# Patient Record
Sex: FEMALE | Race: WHITE | NOT HISPANIC OR LATINO | Employment: OTHER | URBAN - METROPOLITAN AREA
[De-identification: names, ages, dates, MRNs, and addresses within clinical notes are randomized per-mention and may not be internally consistent; named-entity substitution may affect disease eponyms.]

---

## 2017-04-17 ENCOUNTER — RECORDS - HEALTHEAST (OUTPATIENT)
Dept: LAB | Facility: CLINIC | Age: 82
End: 2017-04-17

## 2017-04-17 LAB
CHOLEST SERPL-MCNC: 213 MG/DL
FASTING STATUS PATIENT QL REPORTED: YES
HDLC SERPL-MCNC: 69 MG/DL
LDLC SERPL CALC-MCNC: 130 MG/DL
TRIGL SERPL-MCNC: 70 MG/DL

## 2017-10-05 ENCOUNTER — RECORDS - HEALTHEAST (OUTPATIENT)
Dept: LAB | Facility: CLINIC | Age: 82
End: 2017-10-05

## 2017-10-05 LAB
CHOLEST SERPL-MCNC: 208 MG/DL
FASTING STATUS PATIENT QL REPORTED: YES
HDLC SERPL-MCNC: 66 MG/DL
LDLC SERPL CALC-MCNC: 127 MG/DL
TRIGL SERPL-MCNC: 76 MG/DL

## 2018-03-08 ENCOUNTER — RECORDS - HEALTHEAST (OUTPATIENT)
Dept: LAB | Facility: CLINIC | Age: 83
End: 2018-03-08

## 2018-03-08 LAB
ALBUMIN SERPL-MCNC: 4.1 G/DL (ref 3.5–5)
ALP SERPL-CCNC: 61 U/L (ref 45–120)
ALT SERPL W P-5'-P-CCNC: 22 U/L (ref 0–45)
ANION GAP SERPL CALCULATED.3IONS-SCNC: 11 MMOL/L (ref 5–18)
AST SERPL W P-5'-P-CCNC: 28 U/L (ref 0–40)
BASOPHILS # BLD AUTO: 0 THOU/UL (ref 0–0.2)
BASOPHILS NFR BLD AUTO: 1 % (ref 0–2)
BILIRUB SERPL-MCNC: 0.8 MG/DL (ref 0–1)
BUN SERPL-MCNC: 27 MG/DL (ref 8–28)
CALCIUM SERPL-MCNC: 9.8 MG/DL (ref 8.5–10.5)
CHLORIDE BLD-SCNC: 99 MMOL/L (ref 98–107)
CHOLEST SERPL-MCNC: 245 MG/DL
CO2 SERPL-SCNC: 25 MMOL/L (ref 22–31)
CREAT SERPL-MCNC: 0.83 MG/DL (ref 0.6–1.1)
EOSINOPHIL # BLD AUTO: 0.2 THOU/UL (ref 0–0.4)
EOSINOPHIL NFR BLD AUTO: 3 % (ref 0–6)
ERYTHROCYTE [DISTWIDTH] IN BLOOD BY AUTOMATED COUNT: 12.9 % (ref 11–14.5)
FASTING STATUS PATIENT QL REPORTED: YES
GFR SERPL CREATININE-BSD FRML MDRD: >60 ML/MIN/1.73M2
GLUCOSE BLD-MCNC: 98 MG/DL (ref 70–125)
HCT VFR BLD AUTO: 40.8 % (ref 35–47)
HDLC SERPL-MCNC: 70 MG/DL
HGB BLD-MCNC: 13.6 G/DL (ref 12–16)
LDLC SERPL CALC-MCNC: 158 MG/DL
LYMPHOCYTES # BLD AUTO: 2.2 THOU/UL (ref 0.8–4.4)
LYMPHOCYTES NFR BLD AUTO: 34 % (ref 20–40)
MCH RBC QN AUTO: 29.9 PG (ref 27–34)
MCHC RBC AUTO-ENTMCNC: 33.3 G/DL (ref 32–36)
MCV RBC AUTO: 90 FL (ref 80–100)
MONOCYTES # BLD AUTO: 0.6 THOU/UL (ref 0–0.9)
MONOCYTES NFR BLD AUTO: 10 % (ref 2–10)
NEUTROPHILS # BLD AUTO: 3.4 THOU/UL (ref 2–7.7)
NEUTROPHILS NFR BLD AUTO: 53 % (ref 50–70)
PLATELET # BLD AUTO: 174 THOU/UL (ref 140–440)
PMV BLD AUTO: 9.3 FL (ref 8.5–12.5)
POTASSIUM BLD-SCNC: 4.6 MMOL/L (ref 3.5–5)
PROT SERPL-MCNC: 6.9 G/DL (ref 6–8)
RBC # BLD AUTO: 4.55 MILL/UL (ref 3.8–5.4)
SODIUM SERPL-SCNC: 135 MMOL/L (ref 136–145)
TRIGL SERPL-MCNC: 87 MG/DL
TSH SERPL DL<=0.005 MIU/L-ACNC: 2.89 UIU/ML (ref 0.3–5)
WBC: 6.4 THOU/UL (ref 4–11)

## 2018-03-09 LAB — 25(OH)D3 SERPL-MCNC: 47.3 NG/ML (ref 30–80)

## 2018-04-27 ENCOUNTER — RECORDS - HEALTHEAST (OUTPATIENT)
Dept: LAB | Facility: CLINIC | Age: 83
End: 2018-04-27

## 2018-04-27 LAB
ALBUMIN SERPL-MCNC: 3.8 G/DL (ref 3.5–5)
ALP SERPL-CCNC: 72 U/L (ref 45–120)
ALT SERPL W P-5'-P-CCNC: 20 U/L (ref 0–45)
ANION GAP SERPL CALCULATED.3IONS-SCNC: 12 MMOL/L (ref 5–18)
AST SERPL W P-5'-P-CCNC: 24 U/L (ref 0–40)
BILIRUB SERPL-MCNC: 0.6 MG/DL (ref 0–1)
BUN SERPL-MCNC: 20 MG/DL (ref 8–28)
C REACTIVE PROTEIN LHE: 0.3 MG/DL (ref 0–0.8)
CALCIUM SERPL-MCNC: 9.6 MG/DL (ref 8.5–10.5)
CHLORIDE BLD-SCNC: 96 MMOL/L (ref 98–107)
CO2 SERPL-SCNC: 23 MMOL/L (ref 22–31)
CREAT SERPL-MCNC: 0.99 MG/DL (ref 0.6–1.1)
ERYTHROCYTE [DISTWIDTH] IN BLOOD BY AUTOMATED COUNT: 12.2 % (ref 11–14.5)
ERYTHROCYTE [SEDIMENTATION RATE] IN BLOOD BY WESTERGREN METHOD: 9 MM/HR (ref 0–20)
GFR SERPL CREATININE-BSD FRML MDRD: 53 ML/MIN/1.73M2
GLUCOSE BLD-MCNC: 135 MG/DL (ref 70–125)
HCT VFR BLD AUTO: 38.6 % (ref 35–47)
HGB BLD-MCNC: 13.3 G/DL (ref 12–16)
MCH RBC QN AUTO: 30.4 PG (ref 27–34)
MCHC RBC AUTO-ENTMCNC: 34.5 G/DL (ref 32–36)
MCV RBC AUTO: 88 FL (ref 80–100)
PLATELET # BLD AUTO: 149 THOU/UL (ref 140–440)
PMV BLD AUTO: 9.3 FL (ref 8.5–12.5)
POTASSIUM BLD-SCNC: 3.9 MMOL/L (ref 3.5–5)
PROT SERPL-MCNC: 6.6 G/DL (ref 6–8)
RBC # BLD AUTO: 4.37 MILL/UL (ref 3.8–5.4)
SODIUM SERPL-SCNC: 131 MMOL/L (ref 136–145)
WBC: 5.4 THOU/UL (ref 4–11)

## 2018-09-24 ENCOUNTER — RECORDS - HEALTHEAST (OUTPATIENT)
Dept: LAB | Facility: CLINIC | Age: 83
End: 2018-09-24

## 2018-09-24 LAB
ALBUMIN SERPL-MCNC: 3.9 G/DL (ref 3.5–5)
ALP SERPL-CCNC: 58 U/L (ref 45–120)
ALT SERPL W P-5'-P-CCNC: 19 U/L (ref 0–45)
ANION GAP SERPL CALCULATED.3IONS-SCNC: 8 MMOL/L (ref 5–18)
AST SERPL W P-5'-P-CCNC: 26 U/L (ref 0–40)
BASOPHILS # BLD AUTO: 0 THOU/UL (ref 0–0.2)
BASOPHILS NFR BLD AUTO: 1 % (ref 0–2)
BILIRUB SERPL-MCNC: 0.6 MG/DL (ref 0–1)
BUN SERPL-MCNC: 15 MG/DL (ref 8–28)
CALCIUM SERPL-MCNC: 9.5 MG/DL (ref 8.5–10.5)
CHLORIDE BLD-SCNC: 100 MMOL/L (ref 98–107)
CHOLEST SERPL-MCNC: 210 MG/DL
CO2 SERPL-SCNC: 26 MMOL/L (ref 22–31)
CREAT SERPL-MCNC: 0.72 MG/DL (ref 0.6–1.1)
EOSINOPHIL # BLD AUTO: 0.1 THOU/UL (ref 0–0.4)
EOSINOPHIL NFR BLD AUTO: 2 % (ref 0–6)
ERYTHROCYTE [DISTWIDTH] IN BLOOD BY AUTOMATED COUNT: 13 % (ref 11–14.5)
FASTING STATUS PATIENT QL REPORTED: YES
GFR SERPL CREATININE-BSD FRML MDRD: >60 ML/MIN/1.73M2
GLUCOSE BLD-MCNC: 92 MG/DL (ref 70–125)
HCT VFR BLD AUTO: 36.9 % (ref 35–47)
HDLC SERPL-MCNC: 71 MG/DL
HGB BLD-MCNC: 13 G/DL (ref 12–16)
LDLC SERPL CALC-MCNC: 127 MG/DL
LYMPHOCYTES # BLD AUTO: 1.6 THOU/UL (ref 0.8–4.4)
LYMPHOCYTES NFR BLD AUTO: 31 % (ref 20–40)
MCH RBC QN AUTO: 31 PG (ref 27–34)
MCHC RBC AUTO-ENTMCNC: 35.2 G/DL (ref 32–36)
MCV RBC AUTO: 88 FL (ref 80–100)
MONOCYTES # BLD AUTO: 0.5 THOU/UL (ref 0–0.9)
MONOCYTES NFR BLD AUTO: 11 % (ref 2–10)
NEUTROPHILS # BLD AUTO: 2.8 THOU/UL (ref 2–7.7)
NEUTROPHILS NFR BLD AUTO: 56 % (ref 50–70)
PLATELET # BLD AUTO: 175 THOU/UL (ref 140–440)
PMV BLD AUTO: 9.3 FL (ref 8.5–12.5)
POTASSIUM BLD-SCNC: 4.5 MMOL/L (ref 3.5–5)
PROT SERPL-MCNC: 6.4 G/DL (ref 6–8)
RBC # BLD AUTO: 4.2 MILL/UL (ref 3.8–5.4)
SODIUM SERPL-SCNC: 134 MMOL/L (ref 136–145)
T4 FREE SERPL-MCNC: 1.4 NG/DL (ref 0.7–1.8)
TRIGL SERPL-MCNC: 61 MG/DL
TSH SERPL DL<=0.005 MIU/L-ACNC: 1.62 UIU/ML (ref 0.3–5)
WBC: 5 THOU/UL (ref 4–11)

## 2018-09-25 LAB — 25(OH)D3 SERPL-MCNC: 28.2 NG/ML (ref 30–80)

## 2019-03-18 ENCOUNTER — RECORDS - HEALTHEAST (OUTPATIENT)
Dept: LAB | Facility: CLINIC | Age: 84
End: 2019-03-18

## 2019-03-18 LAB
ALBUMIN SERPL-MCNC: 4.1 G/DL (ref 3.5–5)
ALP SERPL-CCNC: 60 U/L (ref 45–120)
ALT SERPL W P-5'-P-CCNC: 17 U/L (ref 0–45)
ANION GAP SERPL CALCULATED.3IONS-SCNC: 11 MMOL/L (ref 5–18)
AST SERPL W P-5'-P-CCNC: 24 U/L (ref 0–40)
BASOPHILS # BLD AUTO: 0 THOU/UL (ref 0–0.2)
BASOPHILS NFR BLD AUTO: 1 % (ref 0–2)
BILIRUB SERPL-MCNC: 0.6 MG/DL (ref 0–1)
BUN SERPL-MCNC: 16 MG/DL (ref 8–28)
CALCIUM SERPL-MCNC: 9.6 MG/DL (ref 8.5–10.5)
CHLORIDE BLD-SCNC: 97 MMOL/L (ref 98–107)
CHOLEST SERPL-MCNC: 221 MG/DL
CO2 SERPL-SCNC: 24 MMOL/L (ref 22–31)
CREAT SERPL-MCNC: 0.85 MG/DL (ref 0.6–1.1)
EOSINOPHIL # BLD AUTO: 0.2 THOU/UL (ref 0–0.4)
EOSINOPHIL NFR BLD AUTO: 2 % (ref 0–6)
ERYTHROCYTE [DISTWIDTH] IN BLOOD BY AUTOMATED COUNT: 12.6 % (ref 11–14.5)
FASTING STATUS PATIENT QL REPORTED: YES
GFR SERPL CREATININE-BSD FRML MDRD: >60 ML/MIN/1.73M2
GLUCOSE BLD-MCNC: 86 MG/DL (ref 70–125)
HCT VFR BLD AUTO: 39.9 % (ref 35–47)
HDLC SERPL-MCNC: 81 MG/DL
HGB BLD-MCNC: 13.3 G/DL (ref 12–16)
LDLC SERPL CALC-MCNC: 125 MG/DL
LYMPHOCYTES # BLD AUTO: 2.3 THOU/UL (ref 0.8–4.4)
LYMPHOCYTES NFR BLD AUTO: 34 % (ref 20–40)
MCH RBC QN AUTO: 30.2 PG (ref 27–34)
MCHC RBC AUTO-ENTMCNC: 33.3 G/DL (ref 32–36)
MCV RBC AUTO: 91 FL (ref 80–100)
MONOCYTES # BLD AUTO: 0.8 THOU/UL (ref 0–0.9)
MONOCYTES NFR BLD AUTO: 12 % (ref 2–10)
NEUTROPHILS # BLD AUTO: 3.5 THOU/UL (ref 2–7.7)
NEUTROPHILS NFR BLD AUTO: 52 % (ref 50–70)
PLATELET # BLD AUTO: 179 THOU/UL (ref 140–440)
PMV BLD AUTO: 9.2 FL (ref 8.5–12.5)
POTASSIUM BLD-SCNC: 4.2 MMOL/L (ref 3.5–5)
PROT SERPL-MCNC: 6.6 G/DL (ref 6–8)
RBC # BLD AUTO: 4.41 MILL/UL (ref 3.8–5.4)
SODIUM SERPL-SCNC: 132 MMOL/L (ref 136–145)
TRIGL SERPL-MCNC: 75 MG/DL
TSH SERPL DL<=0.005 MIU/L-ACNC: 3.22 UIU/ML (ref 0.3–5)
WBC: 6.7 THOU/UL (ref 4–11)

## 2019-03-19 LAB — 25(OH)D3 SERPL-MCNC: 40 NG/ML (ref 30–80)

## 2019-09-19 ENCOUNTER — RECORDS - HEALTHEAST (OUTPATIENT)
Dept: LAB | Facility: CLINIC | Age: 84
End: 2019-09-19

## 2019-09-19 LAB
ALBUMIN SERPL-MCNC: 4.1 G/DL (ref 3.5–5)
ALP SERPL-CCNC: 59 U/L (ref 45–120)
ALT SERPL W P-5'-P-CCNC: 18 U/L (ref 0–45)
ANION GAP SERPL CALCULATED.3IONS-SCNC: 8 MMOL/L (ref 5–18)
AST SERPL W P-5'-P-CCNC: 26 U/L (ref 0–40)
BILIRUB SERPL-MCNC: 0.8 MG/DL (ref 0–1)
BUN SERPL-MCNC: 16 MG/DL (ref 8–28)
CALCIUM SERPL-MCNC: 9.5 MG/DL (ref 8.5–10.5)
CHLORIDE BLD-SCNC: 97 MMOL/L (ref 98–107)
CHOLEST SERPL-MCNC: 219 MG/DL
CO2 SERPL-SCNC: 26 MMOL/L (ref 22–31)
CREAT SERPL-MCNC: 0.81 MG/DL (ref 0.6–1.1)
FASTING STATUS PATIENT QL REPORTED: ABNORMAL
GFR SERPL CREATININE-BSD FRML MDRD: >60 ML/MIN/1.73M2
GLUCOSE BLD-MCNC: 87 MG/DL (ref 70–125)
HDLC SERPL-MCNC: 79 MG/DL
LDLC SERPL CALC-MCNC: 123 MG/DL
POTASSIUM BLD-SCNC: 4.3 MMOL/L (ref 3.5–5)
PROT SERPL-MCNC: 6.5 G/DL (ref 6–8)
SODIUM SERPL-SCNC: 131 MMOL/L (ref 136–145)
TRIGL SERPL-MCNC: 84 MG/DL

## 2019-09-20 LAB — 25(OH)D3 SERPL-MCNC: 37.8 NG/ML (ref 30–80)

## 2019-11-01 ENCOUNTER — RECORDS - HEALTHEAST (OUTPATIENT)
Dept: LAB | Facility: CLINIC | Age: 84
End: 2019-11-01

## 2019-11-01 LAB
O+P STL MICRO: NORMAL
SHIGA TOXIN 1: NEGATIVE
SHIGA TOXIN 2: NEGATIVE

## 2019-11-03 LAB — BACTERIA SPEC CULT: NORMAL

## 2020-05-12 ENCOUNTER — RECORDS - HEALTHEAST (OUTPATIENT)
Dept: LAB | Facility: CLINIC | Age: 85
End: 2020-05-12

## 2020-05-12 LAB
ALBUMIN SERPL-MCNC: 4.1 G/DL (ref 3.5–5)
ALP SERPL-CCNC: 54 U/L (ref 45–120)
ALT SERPL W P-5'-P-CCNC: 14 U/L (ref 0–45)
ANION GAP SERPL CALCULATED.3IONS-SCNC: 8 MMOL/L (ref 5–18)
AST SERPL W P-5'-P-CCNC: 21 U/L (ref 0–40)
BILIRUB SERPL-MCNC: 0.6 MG/DL (ref 0–1)
BUN SERPL-MCNC: 18 MG/DL (ref 8–28)
CALCIUM SERPL-MCNC: 9.5 MG/DL (ref 8.5–10.5)
CHLORIDE BLD-SCNC: 95 MMOL/L (ref 98–107)
CHOLEST SERPL-MCNC: 206 MG/DL
CO2 SERPL-SCNC: 27 MMOL/L (ref 22–31)
CREAT SERPL-MCNC: 0.8 MG/DL (ref 0.6–1.1)
FASTING STATUS PATIENT QL REPORTED: YES
GFR SERPL CREATININE-BSD FRML MDRD: >60 ML/MIN/1.73M2
GLUCOSE BLD-MCNC: 91 MG/DL (ref 70–125)
HDLC SERPL-MCNC: 73 MG/DL
LDLC SERPL CALC-MCNC: 117 MG/DL
POTASSIUM BLD-SCNC: 4.8 MMOL/L (ref 3.5–5)
PROT SERPL-MCNC: 6.5 G/DL (ref 6–8)
SODIUM SERPL-SCNC: 130 MMOL/L (ref 136–145)
T4 FREE SERPL-MCNC: 1.3 NG/DL (ref 0.7–1.8)
TRIGL SERPL-MCNC: 79 MG/DL
TSH SERPL DL<=0.005 MIU/L-ACNC: 1.91 UIU/ML (ref 0.3–5)

## 2020-05-15 LAB — 25(OH)D3 SERPL-MCNC: 34.3 NG/ML (ref 30–80)

## 2020-10-05 ENCOUNTER — RECORDS - HEALTHEAST (OUTPATIENT)
Dept: LAB | Facility: CLINIC | Age: 85
End: 2020-10-05

## 2020-10-05 LAB
ALBUMIN SERPL-MCNC: 4.4 G/DL (ref 3.5–5)
ALP SERPL-CCNC: 58 U/L (ref 45–120)
ALT SERPL W P-5'-P-CCNC: 16 U/L (ref 0–45)
ANION GAP SERPL CALCULATED.3IONS-SCNC: 11 MMOL/L (ref 5–18)
AST SERPL W P-5'-P-CCNC: 25 U/L (ref 0–40)
BASOPHILS # BLD AUTO: 0 THOU/UL (ref 0–0.2)
BASOPHILS NFR BLD AUTO: 1 % (ref 0–2)
BILIRUB SERPL-MCNC: 0.9 MG/DL (ref 0–1)
BUN SERPL-MCNC: 14 MG/DL (ref 8–28)
CALCIUM SERPL-MCNC: 9.6 MG/DL (ref 8.5–10.5)
CHLORIDE BLD-SCNC: 95 MMOL/L (ref 98–107)
CHOLEST SERPL-MCNC: 214 MG/DL
CO2 SERPL-SCNC: 24 MMOL/L (ref 22–31)
CREAT SERPL-MCNC: 0.77 MG/DL (ref 0.6–1.1)
EOSINOPHIL # BLD AUTO: 0.1 THOU/UL (ref 0–0.4)
EOSINOPHIL NFR BLD AUTO: 2 % (ref 0–6)
ERYTHROCYTE [DISTWIDTH] IN BLOOD BY AUTOMATED COUNT: 12.8 % (ref 11–14.5)
FASTING STATUS PATIENT QL REPORTED: ABNORMAL
GFR SERPL CREATININE-BSD FRML MDRD: >60 ML/MIN/1.73M2
GLUCOSE BLD-MCNC: 88 MG/DL (ref 70–125)
HCT VFR BLD AUTO: 39.8 % (ref 35–47)
HDLC SERPL-MCNC: 77 MG/DL
HGB BLD-MCNC: 13.5 G/DL (ref 12–16)
IMM GRANULOCYTES # BLD: 0 THOU/UL
IMM GRANULOCYTES NFR BLD: 0 %
LDLC SERPL CALC-MCNC: 121 MG/DL
LYMPHOCYTES # BLD AUTO: 2.3 THOU/UL (ref 0.8–4.4)
LYMPHOCYTES NFR BLD AUTO: 36 % (ref 20–40)
MCH RBC QN AUTO: 30.7 PG (ref 27–34)
MCHC RBC AUTO-ENTMCNC: 33.9 G/DL (ref 32–36)
MCV RBC AUTO: 91 FL (ref 80–100)
MONOCYTES # BLD AUTO: 0.7 THOU/UL (ref 0–0.9)
MONOCYTES NFR BLD AUTO: 11 % (ref 2–10)
NEUTROPHILS # BLD AUTO: 3.1 THOU/UL (ref 2–7.7)
NEUTROPHILS NFR BLD AUTO: 50 % (ref 50–70)
PLATELET # BLD AUTO: 177 THOU/UL (ref 140–440)
PMV BLD AUTO: 9.3 FL (ref 8.5–12.5)
POTASSIUM BLD-SCNC: 4.5 MMOL/L (ref 3.5–5)
PROT SERPL-MCNC: 6.7 G/DL (ref 6–8)
RBC # BLD AUTO: 4.4 MILL/UL (ref 3.8–5.4)
SODIUM SERPL-SCNC: 130 MMOL/L (ref 136–145)
T4 FREE SERPL-MCNC: 1.3 NG/DL (ref 0.7–1.8)
TRIGL SERPL-MCNC: 81 MG/DL
TSH SERPL DL<=0.005 MIU/L-ACNC: 2.54 UIU/ML (ref 0.3–5)
WBC: 6.2 THOU/UL (ref 4–11)

## 2020-10-06 LAB — 25(OH)D3 SERPL-MCNC: 44.6 NG/ML (ref 30–80)

## 2021-02-26 ENCOUNTER — AMBULATORY - HEALTHEAST (OUTPATIENT)
Dept: NURSING | Facility: CLINIC | Age: 86
End: 2021-02-26

## 2021-03-22 ENCOUNTER — AMBULATORY - HEALTHEAST (OUTPATIENT)
Dept: NURSING | Facility: CLINIC | Age: 86
End: 2021-03-22

## 2021-04-13 ENCOUNTER — RECORDS - HEALTHEAST (OUTPATIENT)
Dept: LAB | Facility: CLINIC | Age: 86
End: 2021-04-13

## 2021-04-13 LAB
ALBUMIN SERPL-MCNC: 4.2 G/DL (ref 3.5–5)
ALP SERPL-CCNC: 63 U/L (ref 45–120)
ALT SERPL W P-5'-P-CCNC: 13 U/L (ref 0–45)
ANION GAP SERPL CALCULATED.3IONS-SCNC: 12 MMOL/L (ref 5–18)
AST SERPL W P-5'-P-CCNC: 22 U/L (ref 0–40)
BASOPHILS # BLD AUTO: 0.1 THOU/UL (ref 0–0.2)
BASOPHILS NFR BLD AUTO: 1 % (ref 0–2)
BILIRUB SERPL-MCNC: 0.9 MG/DL (ref 0–1)
BUN SERPL-MCNC: 17 MG/DL (ref 8–28)
CALCIUM SERPL-MCNC: 9.5 MG/DL (ref 8.5–10.5)
CHLORIDE BLD-SCNC: 94 MMOL/L (ref 98–107)
CHOLEST SERPL-MCNC: 227 MG/DL
CO2 SERPL-SCNC: 24 MMOL/L (ref 22–31)
CREAT SERPL-MCNC: 0.75 MG/DL (ref 0.6–1.1)
EOSINOPHIL # BLD AUTO: 0.1 THOU/UL (ref 0–0.4)
EOSINOPHIL NFR BLD AUTO: 2 % (ref 0–6)
ERYTHROCYTE [DISTWIDTH] IN BLOOD BY AUTOMATED COUNT: 12.8 % (ref 11–14.5)
FASTING STATUS PATIENT QL REPORTED: YES
GFR SERPL CREATININE-BSD FRML MDRD: >60 ML/MIN/1.73M2
GLUCOSE BLD-MCNC: 95 MG/DL (ref 70–125)
HCT VFR BLD AUTO: 40.6 % (ref 35–47)
HDLC SERPL-MCNC: 77 MG/DL
HGB BLD-MCNC: 13.6 G/DL (ref 12–16)
IMM GRANULOCYTES # BLD: 0 THOU/UL
IMM GRANULOCYTES NFR BLD: 0 %
LDLC SERPL CALC-MCNC: 132 MG/DL
LYMPHOCYTES # BLD AUTO: 2.6 THOU/UL (ref 0.8–4.4)
LYMPHOCYTES NFR BLD AUTO: 34 % (ref 20–40)
MCH RBC QN AUTO: 30.2 PG (ref 27–34)
MCHC RBC AUTO-ENTMCNC: 33.5 G/DL (ref 32–36)
MCV RBC AUTO: 90 FL (ref 80–100)
MONOCYTES # BLD AUTO: 0.8 THOU/UL (ref 0–0.9)
MONOCYTES NFR BLD AUTO: 10 % (ref 2–10)
NEUTROPHILS # BLD AUTO: 4 THOU/UL (ref 2–7.7)
NEUTROPHILS NFR BLD AUTO: 53 % (ref 50–70)
PLATELET # BLD AUTO: 199 THOU/UL (ref 140–440)
PMV BLD AUTO: 9.2 FL (ref 8.5–12.5)
POTASSIUM BLD-SCNC: 4.5 MMOL/L (ref 3.5–5)
PROT SERPL-MCNC: 6.6 G/DL (ref 6–8)
RBC # BLD AUTO: 4.5 MILL/UL (ref 3.8–5.4)
SODIUM SERPL-SCNC: 130 MMOL/L (ref 136–145)
T4 FREE SERPL-MCNC: 1.4 NG/DL (ref 0.7–1.8)
TRIGL SERPL-MCNC: 91 MG/DL
TSH SERPL DL<=0.005 MIU/L-ACNC: 1.32 UIU/ML (ref 0.3–5)
WBC: 7.6 THOU/UL (ref 4–11)

## 2021-04-14 LAB — 25(OH)D3 SERPL-MCNC: 42.9 NG/ML (ref 30–80)

## 2021-05-25 ENCOUNTER — RECORDS - HEALTHEAST (OUTPATIENT)
Dept: ADMINISTRATIVE | Facility: CLINIC | Age: 86
End: 2021-05-25

## 2021-05-26 ENCOUNTER — RECORDS - HEALTHEAST (OUTPATIENT)
Dept: ADMINISTRATIVE | Facility: CLINIC | Age: 86
End: 2021-05-26

## 2021-05-27 ENCOUNTER — RECORDS - HEALTHEAST (OUTPATIENT)
Dept: ADMINISTRATIVE | Facility: CLINIC | Age: 86
End: 2021-05-27

## 2021-05-28 ENCOUNTER — RECORDS - HEALTHEAST (OUTPATIENT)
Dept: ADMINISTRATIVE | Facility: CLINIC | Age: 86
End: 2021-05-28

## 2021-06-16 PROBLEM — F41.9 ANXIETY: Status: ACTIVE | Noted: 2021-04-26

## 2021-06-16 PROBLEM — I10 HYPERTENSION: Status: ACTIVE | Noted: 2021-04-26

## 2021-06-16 PROBLEM — K14.6 GLOSSODYNIA: Status: ACTIVE | Noted: 2021-04-26

## 2021-06-16 PROBLEM — E03.9 HYPOTHYROIDISM: Status: ACTIVE | Noted: 2021-04-26

## 2021-06-16 PROBLEM — M25.562 PAIN IN LEFT KNEE: Status: ACTIVE | Noted: 2021-04-26

## 2021-06-16 PROBLEM — E78.5 HYPERLIPIDEMIA: Status: ACTIVE | Noted: 2021-04-26

## 2021-06-16 PROBLEM — R26.81 UNSTEADY GAIT: Status: ACTIVE | Noted: 2021-04-26

## 2021-06-16 PROBLEM — J30.2 SEASONAL ALLERGIC RHINITIS: Status: ACTIVE | Noted: 2021-04-26

## 2021-06-16 PROBLEM — E55.9 VITAMIN D DEFICIENCY: Status: ACTIVE | Noted: 2021-04-26

## 2021-07-13 ENCOUNTER — RECORDS - HEALTHEAST (OUTPATIENT)
Dept: ADMINISTRATIVE | Facility: CLINIC | Age: 86
End: 2021-07-13

## 2021-07-21 ENCOUNTER — RECORDS - HEALTHEAST (OUTPATIENT)
Dept: ADMINISTRATIVE | Facility: CLINIC | Age: 86
End: 2021-07-21

## 2021-10-07 ENCOUNTER — LAB REQUISITION (OUTPATIENT)
Dept: LAB | Facility: CLINIC | Age: 86
End: 2021-10-07

## 2021-10-07 DIAGNOSIS — I10 ESSENTIAL (PRIMARY) HYPERTENSION: ICD-10-CM

## 2021-10-07 DIAGNOSIS — E03.9 HYPOTHYROIDISM, UNSPECIFIED: ICD-10-CM

## 2021-10-07 DIAGNOSIS — E78.5 HYPERLIPIDEMIA, UNSPECIFIED: ICD-10-CM

## 2021-10-07 LAB
ALBUMIN SERPL-MCNC: 4.1 G/DL (ref 3.5–5)
ALP SERPL-CCNC: 57 U/L (ref 45–120)
ALT SERPL W P-5'-P-CCNC: 13 U/L (ref 0–45)
ANION GAP SERPL CALCULATED.3IONS-SCNC: 11 MMOL/L (ref 5–18)
AST SERPL W P-5'-P-CCNC: 24 U/L (ref 0–40)
BILIRUB SERPL-MCNC: 0.8 MG/DL (ref 0–1)
BUN SERPL-MCNC: 15 MG/DL (ref 8–28)
CALCIUM SERPL-MCNC: 9.8 MG/DL (ref 8.5–10.5)
CHLORIDE BLD-SCNC: 95 MMOL/L (ref 98–107)
CHOLEST SERPL-MCNC: 220 MG/DL
CO2 SERPL-SCNC: 24 MMOL/L (ref 22–31)
CREAT SERPL-MCNC: 0.8 MG/DL (ref 0.6–1.1)
ERYTHROCYTE [DISTWIDTH] IN BLOOD BY AUTOMATED COUNT: 12.9 % (ref 10–15)
GFR SERPL CREATININE-BSD FRML MDRD: 65 ML/MIN/1.73M2
GLUCOSE BLD-MCNC: 84 MG/DL (ref 70–125)
HCT VFR BLD AUTO: 37.5 % (ref 35–47)
HDLC SERPL-MCNC: 82 MG/DL
HGB BLD-MCNC: 12.9 G/DL (ref 11.7–15.7)
LDLC SERPL CALC-MCNC: 127 MG/DL
MCH RBC QN AUTO: 30.7 PG (ref 26.5–33)
MCHC RBC AUTO-ENTMCNC: 34.4 G/DL (ref 31.5–36.5)
MCV RBC AUTO: 89 FL (ref 78–100)
PLATELET # BLD AUTO: 169 10E3/UL (ref 150–450)
POTASSIUM BLD-SCNC: 4.6 MMOL/L (ref 3.5–5)
PROT SERPL-MCNC: 6.6 G/DL (ref 6–8)
RBC # BLD AUTO: 4.2 10E6/UL (ref 3.8–5.2)
SODIUM SERPL-SCNC: 130 MMOL/L (ref 136–145)
TRIGL SERPL-MCNC: 56 MG/DL
TSH SERPL DL<=0.005 MIU/L-ACNC: 1.37 UIU/ML (ref 0.3–5)
WBC # BLD AUTO: 5.9 10E3/UL (ref 4–11)

## 2021-10-07 PROCEDURE — 82040 ASSAY OF SERUM ALBUMIN: CPT | Performed by: FAMILY MEDICINE

## 2021-10-07 PROCEDURE — 80061 LIPID PANEL: CPT | Performed by: FAMILY MEDICINE

## 2021-10-07 PROCEDURE — 84439 ASSAY OF FREE THYROXINE: CPT | Performed by: FAMILY MEDICINE

## 2021-10-07 PROCEDURE — 84443 ASSAY THYROID STIM HORMONE: CPT | Performed by: FAMILY MEDICINE

## 2021-10-07 PROCEDURE — 82247 BILIRUBIN TOTAL: CPT | Performed by: FAMILY MEDICINE

## 2021-10-07 PROCEDURE — 85027 COMPLETE CBC AUTOMATED: CPT | Performed by: FAMILY MEDICINE

## 2021-10-08 LAB — T4 FREE SERPL-MCNC: 1.43 NG/DL (ref 0.7–1.8)

## 2022-08-19 ENCOUNTER — LAB REQUISITION (OUTPATIENT)
Dept: LAB | Facility: CLINIC | Age: 87
End: 2022-08-19

## 2022-08-19 DIAGNOSIS — E03.9 HYPOTHYROIDISM, UNSPECIFIED: ICD-10-CM

## 2022-08-19 DIAGNOSIS — I10 ESSENTIAL (PRIMARY) HYPERTENSION: ICD-10-CM

## 2022-08-19 LAB
ANION GAP SERPL CALCULATED.3IONS-SCNC: 11 MMOL/L (ref 7–15)
BUN SERPL-MCNC: 18 MG/DL (ref 8–23)
CALCIUM SERPL-MCNC: 9.5 MG/DL (ref 8.2–9.6)
CHLORIDE SERPL-SCNC: 94 MMOL/L (ref 98–107)
CREAT SERPL-MCNC: 0.75 MG/DL (ref 0.51–0.95)
DEPRECATED HCO3 PLAS-SCNC: 25 MMOL/L (ref 22–29)
GFR SERPL CREATININE-BSD FRML MDRD: 75 ML/MIN/1.73M2
GLUCOSE SERPL-MCNC: 86 MG/DL (ref 70–99)
POTASSIUM SERPL-SCNC: 4.9 MMOL/L (ref 3.4–5.3)
SODIUM SERPL-SCNC: 130 MMOL/L (ref 136–145)
TSH SERPL DL<=0.005 MIU/L-ACNC: 1.69 UIU/ML (ref 0.3–4.2)

## 2022-08-19 PROCEDURE — 80048 BASIC METABOLIC PNL TOTAL CA: CPT | Performed by: STUDENT IN AN ORGANIZED HEALTH CARE EDUCATION/TRAINING PROGRAM

## 2022-08-19 PROCEDURE — 84443 ASSAY THYROID STIM HORMONE: CPT | Performed by: STUDENT IN AN ORGANIZED HEALTH CARE EDUCATION/TRAINING PROGRAM

## 2023-02-06 ENCOUNTER — LAB REQUISITION (OUTPATIENT)
Dept: LAB | Facility: CLINIC | Age: 88
End: 2023-02-06

## 2023-02-06 DIAGNOSIS — E03.9 HYPOTHYROIDISM, UNSPECIFIED: ICD-10-CM

## 2023-02-06 DIAGNOSIS — E87.1 HYPO-OSMOLALITY AND HYPONATREMIA: ICD-10-CM

## 2023-02-06 DIAGNOSIS — I10 ESSENTIAL (PRIMARY) HYPERTENSION: ICD-10-CM

## 2023-02-06 LAB
ANION GAP SERPL CALCULATED.3IONS-SCNC: 14 MMOL/L (ref 7–15)
BUN SERPL-MCNC: 18.4 MG/DL (ref 8–23)
CALCIUM SERPL-MCNC: 9.6 MG/DL (ref 8.2–9.6)
CHLORIDE SERPL-SCNC: 93 MMOL/L (ref 98–107)
CREAT SERPL-MCNC: 0.79 MG/DL (ref 0.51–0.95)
DEPRECATED HCO3 PLAS-SCNC: 22 MMOL/L (ref 22–29)
GFR SERPL CREATININE-BSD FRML MDRD: 70 ML/MIN/1.73M2
GLUCOSE SERPL-MCNC: 90 MG/DL (ref 70–99)
OSMOLALITY UR: 423 MMOL/KG (ref 100–1200)
POTASSIUM SERPL-SCNC: 4.5 MMOL/L (ref 3.4–5.3)
SODIUM SERPL-SCNC: 129 MMOL/L (ref 136–145)
SODIUM UR-SCNC: 69 MMOL/L
TSH SERPL DL<=0.005 MIU/L-ACNC: 1.28 UIU/ML (ref 0.3–4.2)

## 2023-02-06 PROCEDURE — 83935 ASSAY OF URINE OSMOLALITY: CPT | Performed by: STUDENT IN AN ORGANIZED HEALTH CARE EDUCATION/TRAINING PROGRAM

## 2023-02-06 PROCEDURE — 80048 BASIC METABOLIC PNL TOTAL CA: CPT | Performed by: STUDENT IN AN ORGANIZED HEALTH CARE EDUCATION/TRAINING PROGRAM

## 2023-02-06 PROCEDURE — 84443 ASSAY THYROID STIM HORMONE: CPT | Performed by: STUDENT IN AN ORGANIZED HEALTH CARE EDUCATION/TRAINING PROGRAM

## 2023-02-06 PROCEDURE — 84300 ASSAY OF URINE SODIUM: CPT | Performed by: STUDENT IN AN ORGANIZED HEALTH CARE EDUCATION/TRAINING PROGRAM

## 2023-03-31 ENCOUNTER — APPOINTMENT (OUTPATIENT)
Dept: CT IMAGING | Facility: HOSPITAL | Age: 88
End: 2023-03-31
Attending: EMERGENCY MEDICINE
Payer: COMMERCIAL

## 2023-03-31 ENCOUNTER — HOSPITAL ENCOUNTER (EMERGENCY)
Facility: HOSPITAL | Age: 88
Discharge: HOME | End: 2023-03-31
Attending: EMERGENCY MEDICINE | Admitting: EMERGENCY MEDICINE
Payer: COMMERCIAL

## 2023-03-31 VITALS
DIASTOLIC BLOOD PRESSURE: 79 MMHG | HEART RATE: 82 BPM | SYSTOLIC BLOOD PRESSURE: 158 MMHG | TEMPERATURE: 98.2 F | OXYGEN SATURATION: 99 % | RESPIRATION RATE: 24 BRPM

## 2023-03-31 DIAGNOSIS — G93.89 BRAIN MASS: ICD-10-CM

## 2023-03-31 DIAGNOSIS — I10 ACCELERATED HYPERTENSION: ICD-10-CM

## 2023-03-31 LAB
ANION GAP SERPL CALCULATED.3IONS-SCNC: 8 MMOL/L (ref 7–15)
BUN SERPL-MCNC: 12.3 MG/DL (ref 8–23)
CALCIUM SERPL-MCNC: 9.3 MG/DL (ref 8.2–9.6)
CHLORIDE SERPL-SCNC: 91 MMOL/L (ref 98–107)
CREAT SERPL-MCNC: 0.7 MG/DL (ref 0.51–0.95)
DEPRECATED HCO3 PLAS-SCNC: 27 MMOL/L (ref 22–29)
ERYTHROCYTE [DISTWIDTH] IN BLOOD BY AUTOMATED COUNT: 12.8 % (ref 10–15)
GFR SERPL CREATININE-BSD FRML MDRD: 81 ML/MIN/1.73M2
GLUCOSE SERPL-MCNC: 99 MG/DL (ref 70–99)
HCT VFR BLD AUTO: 40 % (ref 35–47)
HGB BLD-MCNC: 14.1 G/DL (ref 11.7–15.7)
MAGNESIUM SERPL-MCNC: 1.8 MG/DL (ref 1.7–2.3)
MCH RBC QN AUTO: 30.7 PG (ref 26.5–33)
MCHC RBC AUTO-ENTMCNC: 35.3 G/DL (ref 31.5–36.5)
MCV RBC AUTO: 87 FL (ref 78–100)
PLATELET # BLD AUTO: 161 10E3/UL (ref 150–450)
POTASSIUM SERPL-SCNC: 4.4 MMOL/L (ref 3.4–5.3)
RBC # BLD AUTO: 4.59 10E6/UL (ref 3.8–5.2)
SODIUM SERPL-SCNC: 126 MMOL/L (ref 136–145)
TROPONIN T SERPL HS-MCNC: 42 NG/L
TROPONIN T SERPL HS-MCNC: 42 NG/L
WBC # BLD AUTO: 4.1 10E3/UL (ref 4–11)

## 2023-03-31 PROCEDURE — 70498 CT ANGIOGRAPHY NECK: CPT

## 2023-03-31 PROCEDURE — 36415 COLL VENOUS BLD VENIPUNCTURE: CPT | Performed by: EMERGENCY MEDICINE

## 2023-03-31 PROCEDURE — 85027 COMPLETE CBC AUTOMATED: CPT | Performed by: EMERGENCY MEDICINE

## 2023-03-31 PROCEDURE — 250N000011 HC RX IP 250 OP 636: Performed by: EMERGENCY MEDICINE

## 2023-03-31 PROCEDURE — 93005 ELECTROCARDIOGRAM TRACING: CPT | Performed by: EMERGENCY MEDICINE

## 2023-03-31 PROCEDURE — 99285 EMERGENCY DEPT VISIT HI MDM: CPT | Mod: 25

## 2023-03-31 PROCEDURE — 82947 ASSAY GLUCOSE BLOOD QUANT: CPT | Performed by: EMERGENCY MEDICINE

## 2023-03-31 PROCEDURE — 96374 THER/PROPH/DIAG INJ IV PUSH: CPT | Mod: 59

## 2023-03-31 PROCEDURE — 83735 ASSAY OF MAGNESIUM: CPT | Performed by: EMERGENCY MEDICINE

## 2023-03-31 PROCEDURE — 84484 ASSAY OF TROPONIN QUANT: CPT | Mod: 91 | Performed by: EMERGENCY MEDICINE

## 2023-03-31 PROCEDURE — 70496 CT ANGIOGRAPHY HEAD: CPT

## 2023-03-31 RX ORDER — LABETALOL HYDROCHLORIDE 5 MG/ML
20 INJECTION, SOLUTION INTRAVENOUS ONCE
Status: COMPLETED | OUTPATIENT
Start: 2023-03-31 | End: 2023-03-31

## 2023-03-31 RX ORDER — IOPAMIDOL 755 MG/ML
75 INJECTION, SOLUTION INTRAVASCULAR ONCE
Status: COMPLETED | OUTPATIENT
Start: 2023-03-31 | End: 2023-03-31

## 2023-03-31 RX ADMIN — LABETALOL HYDROCHLORIDE 20 MG: 5 INJECTION, SOLUTION INTRAVENOUS at 09:29

## 2023-03-31 RX ADMIN — IOPAMIDOL 75 ML: 755 INJECTION, SOLUTION INTRAVENOUS at 10:14

## 2023-03-31 ASSESSMENT — ACTIVITIES OF DAILY LIVING (ADL): ADLS_ACUITY_SCORE: 35

## 2023-03-31 NOTE — ED PROVIDER NOTES
EMERGENCY DEPARTMENT ENCOUNTER      NAME: Mere Stacy  AGE: 89 year old female  YOB: 1931  MRN: [id number]  EVALUATION DATE & TIME: 3/31/2023  9:05 AM    PCP: Michael Rivera    ED PROVIDER: Kristin Farmer MD    Chief Complaint   Patient presents with     Dizziness     Woke up this morning and felt dizzy. BP high when ambulance got to her; still high now.          FINAL IMPRESSION:  1. Accelerated hypertension    2. Brain mass          ED COURSE & MEDICAL DECISION MAKING:    Pertinent Labs & Imaging studies reviewed. (See chart for details)  89 year old female with history of HTN, HLD, anxiety who presents to the Emergency Department for evaluation of feeling dizzy this morning, no contreras vertigo.  Blood pressure noted to be significantly elevated with EMS.  Noncompliant with clonidine prescribed.  Significantly hypertensive here and was very minimal left-sided occipital headache will obtain neuroimaging to rule out ICH, mass lesion, aneurysm.  Patient is neuro intact.  No doubt she has accelerated hypertension and warrants aggressive blood pressure management.  With the associated dizziness concern for arrhythmia, symptomatic anemia, electrolyte abnormality and less likely ACS.    Patient placed on monitor, IV established and blood obtained.  Twelve-lead EKG shows sinus rhythm.  Unchanged from her previous.  Given 20 mg labetalol IV with improvement of blood pressures in the 150s systolic.  This is where patient stated she did not require a second dose.  CBC, BMP, magnesium, troponin notable for hyponatremia sodium of 126.  This slightly less than her baseline of 129.  Troponin elevated at 42.  This is indeterminate range.  This was rechecked 2 hours later and has not changed and remains at 42.  CTA head and neck negative for acute abnormalities.  There is however an incidental calcified mass at the skull base which is most likely a meningioma.  Discussed with patient and her son  following up for outpatient MRI, and neurosurgery to discuss this mass.  Patient has been asymptomatic since ED arrival.  Given her elevated troponin and accelerated hypertension patient was offered admission, but declines wanting to go home with outpatient PCP follow-up for blood pressure management.  Encouraged her to check her blood pressure once daily and keep a diary of this, take the metoprolol, olmesartan and the clonidine as previously prescribed.  Given referral for MRI and neurosurgery.      ED Course as of 03/31/23 1420   Fri Mar 31, 2023   0902 Met with and introduced myself to the patient. Disccused history and plan of care.   0952 Troponin T, High Sensitivity(!): 42   0952 Sodium(!): 126  Chronic 129-130   1026 Spoke w neuro rad - densely calcified skull base mass, likely meningoma w mass effect of L vert junction. CTA otherwise negative.    1152 Troponin T, High Sensitivity(!): 42  No change   1153 Rechecked and updated patient on lab and imaging results. Discussed plan for discharge.   1211 Spoke with patient regarding her medication. She reports that she has not been taking her clonidine. States that it was prescribed to her by her new doctor that she just established care with. She was worried it might affect her and she was not comfortable taking it.     1213 Discussed findings with patient and son at bedside.  Blood pressure has remained stable in the 150s after single dose of labetalol here.  Patient is asymptomatic.       Medical Decision Making    History:    Supplemental history from: EMS    External Record(s) reviewed: Outpatient Record: Review from [facility] clinic visit on 3/15/23    Work Up:    Chart documentation includes differential considered and any EKGs or imaging independently interpreted by provider, where specified.    In additional to work up documented, I considered the following work up: Documented in chart, if applicable.    External consultation:    Discussion of management  with another provider: Documented in chart, if applicable    Complicating factors:    Care impacted by chronic illness: Hypertension    Care affected by social determinants of health: N/A    Disposition considerations: Discharge. No recommendations on prescription strength medication(s). See documentation for any additional details.        At the conclusion of the encounter I discussed the results of all of the tests and the disposition. The questions were answered. The patient or family acknowledged understanding and was agreeable with the care plan.        MEDICATIONS GIVEN IN THE EMERGENCY:  Medications   labetalol (NORMODYNE/TRANDATE) injection 20 mg (20 mg Intravenous $Given 3/31/23 0929)   iopamidol (ISOVUE-370) solution 75 mL (75 mLs Intravenous $Given 3/31/23 1014)       NEW PRESCRIPTIONS STARTED AT TODAY'S ER VISIT  Discharge Medication List as of 3/31/2023 12:15 PM             =================================================================    HPI    Patient information was obtained from: Patient    Use of Intrepreter: N/A      Mere Stacy is a 89 year old female with pertinent medical history of anxiety, hypertension, hyperlipidemia, and hypothyroidism who presents to the ED for evaluation of dizziness, hypertension.    Per medication chart, the patient is currently on 0.1 clonidine twice daily,150 mg metoprolol daily, and 40 mg Olmesartan daily.     Per chart review, the patient presented to [facility] on 3/15/23 for evaluation of hypertension. Currently controlled on metoprolol and olmesartan based on home blood pressure readings. Blood pressure goal less than 150/90 mmHg. Informed to continue olmesartan and metoprolol. Will avoid spironolactone given hyponatremia. Will trial clonidine 0.1 mg twice daily.    The patient reports that this morning around 7:00 AM she developed lightheaded dizziness. No vertigo or room-spinning. She took her blood pressure at this time which was  237/100. States that her normal pressure is around 131. The patient endorses associated neck pain that radiates upwards, but denies any headache. Notes chronic vision loss in her right eye since 2001 due to a cataract. She is currently on metoprolol.     Denies chest pain, shortness of breath, palpitations, or any other complaints at this time.    REVIEW OF SYSTEMS  Constitutional:  Denies fever, chills, weight loss or weakness  Eyes:  No pain, discharge, redness. Positive for vision loss (right, chronic).   Respiratory: No SOB, wheeze or cough  Cardiovascular:  No CP, palpitations  GI:  Denies abdominal pain, nausea, vomiting, diarrhea  Musculoskeletal:  Denies any new muscle/joint pain, swelling or loss of function. Positive for neck pain.  Neurologic:  Denies headache, focal weakness or sensory changes. Positive for dizziness.       PAST MEDICAL HISTORY:  Past Medical History:   Diagnosis Date     Anxiety      HLD (hyperlipidemia)      Hypertension        PAST SURGICAL HISTORY:  History reviewed. No pertinent surgical history.    CURRENT MEDICATIONS:    Prior to Admission Medications   Prescriptions Last Dose Informant Patient Reported? Taking?   hydrALAZINE (APRESOLINE) 10 MG tablet   No No   Sig: [HYDRALAZINE (APRESOLINE) 10 MG TABLET] Take 1 tablet (10 mg total) by mouth 4 (four) times a day.      Facility-Administered Medications: None       ALLERGIES:  Allergies   Allergen Reactions     Ace Inhibitors Unknown     Actonel [Risedronate] Unknown     Amoxicillin Unknown     Ceclor [Cefaclor] Unknown     Cleocin [Clindamycin] Unknown     Codeine Unknown     Dimetapp Dm Cold-Cough (Pe) [Brovex Pb Dm] Unknown     Ephrine [Phenylephrine] Unknown     Hydrochlorothiazide Unknown     Norvasc [Amlodipine] Unknown     Penicillins Unknown       FAMILY HISTORY:  History reviewed. No pertinent family history.    SOCIAL HISTORY:        VITALS:  Patient Vitals for the past 24 hrs:   BP Temp Temp src Pulse Resp SpO2   03/31/23  1100 (!) 158/79 -- -- 82 24 99 %   03/31/23 1045 (!) 164/78 -- -- 85 28 99 %   03/31/23 1030 (!) 184/80 -- -- 80 21 99 %   03/31/23 1015 (!) 203/84 -- -- 83 19 99 %   03/31/23 0945 (!) 157/73 -- -- 76 23 99 %   03/31/23 0930 (!) 197/98 -- -- 76 22 98 %   03/31/23 0915 (!) 229/100 -- -- 76 -- 98 %   03/31/23 0907 (!) 242/111 98.2  F (36.8  C) Oral 78 20 98 %       PHYSICAL EXAM    General Appearance: Well-appearing, well-nourished, no acute distress. Patient appears much younger than stated age.  Head:  Normocephalic, atraumatic  Eyes:  PERRL, conjunctiva/corneas clear, EOM's intact. Large cataract in right eye.  ENT:  Lips, mucosa, and tongue normal; membranes are moist without pallor  Neck:  Supple, no midline tenderness  Cardio:  Regular rate and rhythm, no murmur/gallop/rub, 2+ pulses symmetric in all extremities. Hypertensive.   Pulm:  No respiratory distress, clear to auscultation bilaterally  Back:  No midline tenderness to palpation, no paraspinal tenderness, No CVA tenderness, normal ROM  Abdomen:  Soft, non-tender, non distended,no rebound or guarding.  Extremities: Moves extremities normally.  Normal range of motion without edema  Skin:  Skin warm, dry, no rashes  Neuro:  Alert and oriented ×3, moving all extremities, no gross sensory defects     RADIOLOGY/LABS:  Reviewed all pertinent imaging. Please see official radiology report. All pertinent labs reviewed and interpreted.    Results for orders placed or performed during the hospital encounter of 03/31/23   CTA Head Neck with Contrast    Impression    IMPRESSION:   HEAD CT:  1.  No acute process intracranially.    2.  Densely calcified mass at the level of the skull base/foramen magnum ventrally at the left of midline with some mild mass effect and deformity suggested of the cervicomedullary level and left foramina of Luschka. No ventriculomegaly. 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    3.  No hyperdense hemorrhage or evidence for acute/evolving infarction with chronic ischemic changes deep white matter of both cerebral hemispheres noted.    HEAD CTA:   1.  No evidence for hemodynamically significant stenosis intracranially. Symmetric branch arborization RAMIN, MCA and PCA vascular territory. No pathologic enhancement. Dural venous sinus enhancement is normal.    2.  The densely calcified skull base mass described above partially envelops but does not narrow the left V4 segment seen on series 12/345 - 375. 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    NECK CTA: Extracranial hemodynamic stenoses are present at the bifurcation/proximal ICA level, high-grade on the left and mild on the right. Mild narrowing left ECA origin as well noted. No dissection. No evidence for vertebral arterial stenosis or   dissection.    These results were discussed with referring clinician Dr. Farmer 3/31/2023 10:25 AM.    Key image series 9 image 32   Basic metabolic panel   Result Value Ref Range    Sodium 126 (L) 136 - 145 mmol/L    Potassium 4.4 3.4 - 5.3 mmol/L    Chloride 91 (L) 98 - 107 mmol/L    Carbon Dioxide (CO2) 27 22 - 29 mmol/L    Anion Gap 8 7 - 15 mmol/L    Urea Nitrogen 12.3 8.0 - 23.0 mg/dL    Creatinine 0.70 0.51 - 0.95 mg/dL    Calcium 9.3 8.2 - 9.6 mg/dL    Glucose 99 70 - 99 mg/dL    GFR Estimate 81 >60 mL/min/1.73m2   Result Value Ref Range    Magnesium 1.8 1.7 - 2.3 mg/dL   CBC with platelets   Result Value Ref Range    WBC Count 4.1 4.0 - 11.0 10e3/uL    RBC Count 4.59 3.80 - 5.20 10e6/uL    Hemoglobin 14.1 11.7 - 15.7 g/dL    Hematocrit 40.0 35.0 - 47.0 %    MCV 87 78 - 100 fL    MCH 30.7 26.5 - 33.0 pg    MCHC 35.3 31.5 - 36.5 g/dL    RDW 12.8 10.0 - 15.0 %    Platelet Count 161 150 - 450 10e3/uL   Result Value Ref Range    Troponin T, High  Sensitivity 42 (H) <=14 ng/L   Troponin T, High Sensitivity (now)   Result Value Ref Range    Troponin T, High Sensitivity 42 (H) <=14 ng/L       EKG:  Performed at: 9:22, 31-Mar-23    Impression: Normal sinus rhythm. Possible left atrial enlargement. Septal infarct, age undetermined. Abnormal ECG.     Rate: 77  Rhythm: Sinus rhythm  Axis: 14  NH Interval: 186  QRS Interval: 74  QTc Interval: 434  ST Changes: NA  Comparison: When compared to ECG of 23-Apr-21, no significant change was found.    I have independently reviewed and interpreted the EKG(s) documented above.      The creation of this record is based on the scribe s observations of the work being performed by Kristin Farmer MD and the provider s statements to them. It was created on her behalf by Mayra Liu, a trained medical scribe. This document has been checked and approved by the attending provider.    Kristin Farmer MD  Emergency Medicine  [facility] EMERGENCY DEPARTMENT  [address] [id number] [phone]  Dept: [phone]     Kristin Farmer MD  03/31/23 3989

## 2023-03-31 NOTE — DISCHARGE INSTRUCTIONS
Take the clonidine, metoprolol and olmesartan as previously prescribed.  Follow-up with your primary care doctor for further blood pressure management.  There is a mass in the base of the brain on the left side.  Likely meningioma.  Call to schedule outpatient follow-up MRI and follow-up with neurosurgery.

## 2023-04-01 LAB
ATRIAL RATE - MUSE: 77 BPM
DIASTOLIC BLOOD PRESSURE - MUSE: NORMAL MMHG
INTERPRETATION ECG - MUSE: NORMAL
P AXIS - MUSE: 67 DEGREES
PR INTERVAL - MUSE: 186 MS
QRS DURATION - MUSE: 74 MS
QT - MUSE: 384 MS
QTC - MUSE: 434 MS
R AXIS - MUSE: 14 DEGREES
SYSTOLIC BLOOD PRESSURE - MUSE: NORMAL MMHG
T AXIS - MUSE: 35 DEGREES
VENTRICULAR RATE- MUSE: 77 BPM

## 2023-04-11 ENCOUNTER — HOSPITAL ENCOUNTER (OUTPATIENT)
Dept: MRI IMAGING | Facility: HOSPITAL | Age: 88
Discharge: HOME | End: 2023-04-11
Attending: EMERGENCY MEDICINE | Admitting: EMERGENCY MEDICINE
Payer: COMMERCIAL

## 2023-04-11 DIAGNOSIS — G93.89 BRAIN MASS: ICD-10-CM

## 2023-04-11 PROCEDURE — A9585 GADOBUTROL INJECTION: HCPCS | Performed by: EMERGENCY MEDICINE

## 2023-04-11 PROCEDURE — 70553 MRI BRAIN STEM W/O & W/DYE: CPT

## 2023-04-11 PROCEDURE — 255N000002 HC RX 255 OP 636: Performed by: EMERGENCY MEDICINE

## 2023-04-11 RX ORDER — GADOBUTROL 604.72 MG/ML
6 INJECTION INTRAVENOUS ONCE
Status: COMPLETED | OUTPATIENT
Start: 2023-04-11 | End: 2023-04-11

## 2023-04-11 RX ADMIN — GADOBUTROL 6 ML: 604.72 INJECTION INTRAVENOUS at 12:54

## 2023-04-12 ENCOUNTER — PRE VISIT (OUTPATIENT)
Dept: NEUROSURGERY | Facility: CLINIC | Age: 88
End: 2023-04-12
Payer: COMMERCIAL

## 2023-04-12 NOTE — TELEPHONE ENCOUNTER
NEUROSURGERY- NEW PREVISIT PLANNING       Record Status/Location     Referring Provider Epic ED   Diagnosis In 3/31/23 ED note Brain mass   MRI (HEAD, NECK, SPINE) Epic Brain 4/11/23   CT Epic Head Neck 3/31/23   X-ray  NO   Injection  NO   PHYSICAL THERAPY  NO   SURGERY  NO

## 2023-04-19 ENCOUNTER — OFFICE VISIT (OUTPATIENT)
Dept: NEUROSURGERY | Facility: CLINIC | Age: 88
End: 2023-04-19
Payer: COMMERCIAL

## 2023-04-19 VITALS — SYSTOLIC BLOOD PRESSURE: 120 MMHG | DIASTOLIC BLOOD PRESSURE: 90 MMHG | HEART RATE: 64 BPM | OXYGEN SATURATION: 96 %

## 2023-04-19 DIAGNOSIS — R44.3 HALLUCINATIONS: Primary | ICD-10-CM

## 2023-04-19 PROCEDURE — 99203 OFFICE O/P NEW LOW 30 MIN: CPT | Performed by: SURGERY

## 2023-04-19 RX ORDER — LEVOTHYROXINE SODIUM 100 UG/1
TABLET ORAL
COMMUNITY

## 2023-04-19 RX ORDER — CLONIDINE HYDROCHLORIDE 0.2 MG/1
1 TABLET ORAL
COMMUNITY
Start: 2023-04-11

## 2023-04-19 RX ORDER — METOPROLOL SUCCINATE 100 MG/1
TABLET, EXTENDED RELEASE ORAL
COMMUNITY

## 2023-04-19 RX ORDER — OLMESARTAN MEDOXOMIL 40 MG/1
TABLET ORAL
COMMUNITY

## 2023-04-19 NOTE — PROGRESS NOTES
the patient is a 89-year-old female.  She is here with her son.  On MRI she has what is probably an incidental meningioma.  It is attached to the clivus and indenting the front of her brainstem.  It is calcified.  I showed the pictures to the patient and her son.  We are not planning on surgery.  She also complains of hallucinations.  Plan neurology consult.  The patient and her son are satisfied with the plan.  Total time 20 minutes, more than 50% spent counseling and/or coordinating care.

## 2023-04-19 NOTE — LETTER
4/19/2023         RE: Mere Stacy  [address]  [place] [address]        Dear Colleague,    Thank you for referring your patient, Mere Stacy, to the [facility]. Please see a copy of my visit note below.     the patient is a 89-year-old female.  She is here with her son.  On MRI she has what is probably an incidental meningioma.  It is attached to the clivus and indenting the front of her brainstem.  It is calcified.  I showed the pictures to the patient and her son.  We are not planning on surgery.  She also complains of hallucinations.  Plan neurology consult.  The patient and her son are satisfied with the plan.  Total time 20 minutes, more than 50% spent counseling and/or coordinating care.      Again, thank you for allowing me to participate in the care of your patient.        Sincerely,        Yuriy Kaye MD

## 2023-04-29 NOTE — TELEPHONE ENCOUNTER
RECORDS RECEIVED FROM:    REASON FOR VISIT: Hallucinations    Date of Appt: 6/6/2023   NOTES (FOR ALL VISITS) STATUS DETAILS   OFFICE NOTE from referring provider Tyrese Kaye-4/19/2023   OFFICE NOTE from other specialist [facility] ED Visit-3/31/2023   DISCHARGE SUMMARY from hospital     DISCHARGE REPORT from the ER     OPERATIVE REPORT     [facility] (MS ONLY)     EMG     EEG     MEDICATION LIST     IMAGING  (FOR ALL VISITS)     LUMBAR PUNCTURE     NITA SCAN (MOVEMENT)     ULTRASOUND (CAROTID BILAT) *VASCULAR*     MRI (HEAD, NECK, SPINE) PACS  MR Brain-4/11/2023   CT (HEAD, NECK, SPINE) PACS  CTA Head/Neck-3/31/2023

## 2023-06-06 ENCOUNTER — PRE VISIT (OUTPATIENT)
Dept: NEUROLOGY | Facility: CLINIC | Age: 88
End: 2023-06-06

## 2023-06-06 ENCOUNTER — TELEPHONE (OUTPATIENT)
Dept: NEUROLOGY | Facility: CLINIC | Age: 88
End: 2023-06-06

## 2023-06-06 ENCOUNTER — VIRTUAL VISIT (OUTPATIENT)
Dept: NEUROLOGY | Facility: CLINIC | Age: 88
End: 2023-06-06
Payer: COMMERCIAL

## 2023-06-06 DIAGNOSIS — F03.B2 MODERATE DEMENTIA WITH PSYCHOTIC DISTURBANCE, UNSPECIFIED DEMENTIA TYPE (H): Primary | ICD-10-CM

## 2023-06-06 DIAGNOSIS — F01.B11 MODERATE VASCULAR DEMENTIA WITH AGITATION (H): ICD-10-CM

## 2023-06-06 PROCEDURE — 99205 OFFICE O/P NEW HI 60 MIN: CPT | Mod: VID | Performed by: PSYCHIATRY & NEUROLOGY

## 2023-06-06 PROCEDURE — 99417 PROLNG OP E/M EACH 15 MIN: CPT | Performed by: PSYCHIATRY & NEUROLOGY

## 2023-06-06 RX ORDER — RIVASTIGMINE TARTRATE 1.5 MG/1
CAPSULE ORAL
Qty: 162 CAPSULE | Refills: 0 | Status: SHIPPED | OUTPATIENT
Start: 2023-06-06 | End: 2023-08-03

## 2023-06-06 RX ORDER — QUETIAPINE FUMARATE 25 MG/1
TABLET, FILM COATED ORAL
Qty: 34 TABLET | Refills: 0 | Status: SHIPPED | OUTPATIENT
Start: 2023-06-06 | End: 2023-07-13

## 2023-06-06 NOTE — PATIENT INSTRUCTIONS
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    She should first focus on obtaining a stable schedule and routine at her memory care facility.  To aide in this and to reduce her agitation, these medications can be started:  - EKG  - Seroquel 12.5 mg at bedtime for 7 days, then   - 25 mg at bedtime  - Rivastigmine 1.5 mg QAM for 7 days, then   - 1.5 mg BID for 7 days, then   - 3 mg BID    To help with community resources:  -  referral

## 2023-06-06 NOTE — TELEPHONE ENCOUNTER
[facility] Call Center    Phone Message    May a detailed message be left on voicemail: yes     Reason for Call: Other: Patients son Kelechi called in regards to some follow up questions from todays visit. Kelechi stated that it was in regards to the MRI that Jasmine Sun was requesting for the patient to have done. Kelechi stated that the patient had a MRI done on March 31st, 2023. He would like to discuss this further.  Please call Kelechi back and advise at [phone].    Action Taken: Message routed to:  [facility] ([facility]): Neurology     Travel Screening: Not Applicable

## 2023-06-06 NOTE — PROGRESS NOTES
Mere is a 89 year old who is being evaluated via a billable video visit.      How would you like to obtain your AVS? Mail  If the video visit is dropped, the invitation should be resent by:[phone]  [email or web address]        Video-Visit Details    Type of service:  Video Visit   Video Start Time: 0930  Video End Time:10:33 AM    Originating Location (pt. Location): Home    Distant Location (provider location):  On-site  Platform used for Video Visit: [employer]

## 2023-06-06 NOTE — PROGRESS NOTES
"[facility] Neurology Consultation    Mere Stacy MRN# [id number]   Age: 89 year old YOB: 1931     Requesting physician: Simon Banegas     Reason for Consultation: hallucinations      History of Presenting Symptoms:   Mere Stacy is a 89 year old female who presents today for evaluation of hallucinations.  The patient has a pertinent medical history of HTN, HLD, and anxiety. She was seen 3/31/2023 in the ED for dizziness, and headache (left occipital).  While in the ED, she was noted to have elevated BP, elevated troponin, hyponatremia (126), and a head CT which showed a densely calcified skull base mass which was likely a meningioma leading to mass effect of the L-vertebral junction but with normal CTA head and neck in terms of occlusion/stensosis/aneurysm.    The patient was seen 4/19/2023 with Neurosurgery as an outpatient and her MRI brain 4/11/2023 was reviewed, with an impression being made that she likely has a meningioma.  She was referred to neurology for reported hallucinations.    Social work notes 5/29/2023 indicate the patient was at her AL facility (moved there two months prior) and having periods of aggression at night requiring 1:1. She was making odd statements such as demanding breast mild for an infant that was not present, and was \"trying to save the babies\".  She was taken to an ED 5/29/2023 where it was noted she was becoming increasingly confused over a period of 2-3 weeks. It was mentioned she was having difficulty sleeping at night, was wandering, and was having an increase in visual hallucinations.  The hallucinations were reported to have been occurring for about a year.  In the ED, she had noted elevated BP (244/158) without fever.  Psychiatry consultation on 6/2/2023 led to recommendation to discharge to a memory care facility, to reduce risperidone to 0.25 mg at bedtime, and to reduce clonidine if possible.    Today, the patient is here today " with Mike (states it is her older brother, and indicates he runs the whole company) and his wife (Halina).  She tells me that there is a big underground station affecting her, then tells me to step through the screen to evaluate her marks/skin lesions.  She then tells me she was up all night, and then starts talking about birds falling down.  She then tells me she was fighting people in the hospital to save her baby and cannot name the child or age of her child.  She then tells me her  is present and needs to go back to his hospital and then tries to find his hat.    Mike actually indicates his name is Kelechi and her son, and Halina is his wife. They are the patient's combined POA and POH.  They tell me that Samia first started seeing things that weren't there a year ago, and prior to this she was completely independent in her own home.  Her first hallucinations were predominantly people but slowly turned to full vision flowers and mick. The people she described were children predominately, but have become others now.  Only recently has she developed delusions and made decisions based on those delusions. She often talks as if people who have passed away are still present.      This winter (2022 through 2023) she had an event of walking outside wandering to find someone and she then stayed with her son due to safety reasons.  This happened on 04/06/2023.  She then moved to an assisted living facility in [place], which occurred 4/27/2023.  At the AL facility, she actually was doing well from a behavioral standpoint but had a decline after moving in.      At this time, she is consistently confused and hasn't returned to her prior lucid state of a year ago. She had a noted improvement upon starting risperidone.       Social History:   She tells me she completed high school. She was an  in her past.  She has had a child, and also reports she lost a child in her past (she then describes that she had a  "blood clot on her first period and this was her lost child).  Her  passed away 7 years ago.       Medications:   Clonidine  Levothyroxine  Metoprolol  Olmesartan     Physical Exam:   General: Seated comfortably in no acute distress.  Neurologic:     Mental Status: Fully alert, attentions drifts, highly tangential. Not oriented to place, but is oriented to time.  Cannot do simple math problems. When asked to recall what 9/11 was in [place] history she tells me that \"It was the end of world.  Everyone was running over the house.  Bombed, they came over\". She doesn't name [place], planes, or [place].\"  She calls her son her brother, she goes to talk or get her dead  to come to the visit.     Cranial Nerves: EOMI with normal smooth pursuit. Facial movements symmetric. Hearing not formally tested but intact to conversation.  No dysarthria.     Motor: No tremors or other abnormal movements observed.      Coordination: Finger-nose-finger without dysmetria.     Gait: Stands easily but has a stooped posture.         Data: Pertinent prior to visit   Imaging:  MRI brain: 4/11/2023:                                                      IMPRESSION:  1.  Calcified meningioma centered in the ventral aspect of the foramen magnum, which displaces and deforms the contour of the medulla/cervicomedullary junction, overlies the left hypoglossal canal, and encases the left vertebral artery V4 segment. Patent foramen magnum. No midline shift, herniation or hydrocephalus.  2.  Moderate chronic small vessel ischemic disease and mild-to-moderate generalized brain parenchymal volume loss    Labs:   - 6/2/2023:  TSH was normal. BMP showed elevated sodium (135). CBC was normal.         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  Her overall symptom profile relating to poor focus, delusions, poor recall regarding her personal history, and well defined visual hallucinations seems to be that of an atypical Alzheimer's over other conditions like DLB given her lack of parkinsonism on exam.  At this point, I do not think she would tolerate further cognitive testing or even functional testing and that focus on agitation and delusion control will be paramount to improving her quality of life and avoiding frequent ED visits for self harm/agitation.      Plan:  - EKG  - Seroquel 12.5 mg at bedtime for 7 days, then   - 25 mg at bedtime  - Rivastigmine 1.5 mg QAM for 7 days, then   - 1.5 mg BID for 7 days, then   - 3 mg BID  -  referral    Follow up in Neurology clinic in 2-3 months, or should new concerns arise.    COLLIN Ferraro D.O.   of Neurology    Total time  today (99 min) in this patient encounter was spent on pre-charting, counseling and/or coordination of care.  The patient is in agreement with this plan and has no further questions.

## 2023-06-06 NOTE — LETTER
"6/6/2023       RE: Mere Stacy  [address]  [facility] [address]     Dear Colleague,    Thank you for referring your patient, Mere Stacy, to the [facility] [place] [facility]. Please see a copy of my visit note below.    [facility] Neurology Consultation    Mere Stacy MRN# [id number]   Age: 89 year old YOB: 1931     Requesting physician: Simon Banegas     Reason for Consultation: hallucinations      History of Presenting Symptoms:   Mere Stacy is a 89 year old female who presents today for evaluation of hallucinations.  The patient has a pertinent medical history of HTN, HLD, and anxiety. She was seen 3/31/2023 in the ED for dizziness, and headache (left occipital).  While in the ED, she was noted to have elevated BP, elevated troponin, hyponatremia (126), and a head CT which showed a densely calcified skull base mass which was likely a meningioma leading to mass effect of the L-vertebral junction but with normal CTA head and neck in terms of occlusion/stensosis/aneurysm.    The patient was seen 4/19/2023 with Neurosurgery as an outpatient and her MRI brain 4/11/2023 was reviewed, with an impression being made that she likely has a meningioma.  She was referred to neurology for reported hallucinations.    Social work notes 5/29/2023 indicate the patient was at her AL facility (moved there two months prior) and having periods of aggression at night requiring 1:1. She was making odd statements such as demanding breast mild for an infant that was not present, and was \"trying to save the babies\".  She was taken to an ED 5/29/2023 where it was noted she was becoming increasingly confused over a period of 2-3 weeks. It was mentioned she was having difficulty sleeping at night, was wandering, and was having an increase in visual hallucinations.  The hallucinations were reported to " have been occurring for about a year.  In the ED, she had noted elevated BP (244/158) without fever.  Psychiatry consultation on 6/2/2023 led to recommendation to discharge to a memory care facility, to reduce risperidone to 0.25 mg at bedtime, and to reduce clonidine if possible.    Today, the patient is here today with Mike (states it is her older brother, and indicates he runs the whole company) and his wife (Halina).  She tells me that there is a big underground station affecting her, then tells me to step through the screen to evaluate her marks/skin lesions.  She then tells me she was up all night, and then starts talking about birds falling down.  She then tells me she was fighting people in the hospital to save her baby and cannot name the child or age of her child.  She then tells me her  is present and needs to go back to his hospital and then tries to find his hat.    Mike actually indicates his name is Kelechi and her son, and Halina is his wife. They are the patient's combined POA and POH.  They tell me that Samia first started seeing things that weren't there a year ago, and prior to this she was completely independent in her own home.  Her first hallucinations were predominantly people but slowly turned to full vision flowers and mick. The people she described were children predominately, but have become others now.  Only recently has she developed delusions and made decisions based on those delusions. She often talks as if people who have passed away are still present.      This winter (2022 through 2023) she had an event of walking outside wandering to find someone and she then stayed with her son due to safety reasons.  This happened on 04/06/2023.  She then moved to an assisted living facility in [place], which occurred 4/27/2023.  At the AL facility, she actually was doing well from a behavioral standpoint but had a decline after moving in.      At this time, she is consistently confused and  "hasn't returned to her prior lucid state of a year ago. She had a noted improvement upon starting risperidone.       Social History:   She tells me she completed high school. She was an  in her past.  She has had a child, and also reports she lost a child in her past (she then describes that she had a blood clot on her first period and this was her lost child).  Her  passed away 7 years ago.       Medications:   Clonidine  Levothyroxine  Metoprolol  Olmesartan     Physical Exam:   General: Seated comfortably in no acute distress.  Neurologic:     Mental Status: Fully alert, attentions drifts, highly tangential. Not oriented to place, but is oriented to time.  Cannot do simple math problems. When asked to recall what 9/11 was in [place] history she tells me that \"It was the end of world.  Everyone was running over the house.  Bombed, they came over\". She doesn't name [place], planes, or [place].\"  She calls her son her brother, she goes to talk or get her dead  to come to the visit.     Cranial Nerves: EOMI with normal smooth pursuit. Facial movements symmetric. Hearing not formally tested but intact to conversation.  No dysarthria.     Motor: No tremors or other abnormal movements observed.      Coordination: Finger-nose-finger without dysmetria.     Gait: Stands easily but has a stooped posture.         Data: Pertinent prior to visit   Imaging:  MRI brain: 4/11/2023:                                                      IMPRESSION:  1.  Calcified meningioma centered in the ventral aspect of the foramen magnum, which displaces and deforms the contour of the medulla/cervicomedullary junction, overlies the left hypoglossal canal, and encases the left vertebral artery V4 segment. Patent foramen magnum. No midline shift, herniation or hydrocephalus.  2.  Moderate chronic small vessel ischemic disease and mild-to-moderate generalized brain parenchymal volume loss    Labs:   - 6/2/2023:  TSH was " normal. BMP showed elevated sodium (135). CBC was normal.         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  Her overall symptom profile relating to poor focus, delusions, poor recall regarding her personal history, and well defined visual hallucinations seems to be that of an atypical Alzheimer's over other conditions like DLB given her lack of parkinsonism on exam.  At this point, I do not think she would tolerate further cognitive testing or even functional testing and that focus on agitation and delusion control will be paramount to improving her quality of life and avoiding frequent ED visits for self harm/agitation.      Plan:  - EKG  - Seroquel 12.5 mg at bedtime for 7 days, then   - 25 mg at bedtime  - Rivastigmine 1.5 mg QAM for 7 days, then   - 1.5 mg BID for 7 days, then   - 3 mg BID  -  referral    Follow up in Neurology clinic in 2-3 months, or should new concerns arise.    COLLIN Ferraro D.O.   of Neurology    Total time  today (99 min) in this patient encounter was spent on pre-charting, counseling and/or coordination of care.  The patient is in agreement with this plan and has no further questions.      Mere is a 89 year old who is being evaluated via a billable video visit.      How would you like to obtain your AVS? Mail  If the video visit is dropped, the invitation should be resent by:[phone]  [email or web address]        Video-Visit Details    Type of service:  Video Visit   Video Start Time: 0930  Video End Time:10:33 AM    Originating Location (pt. Location): Home    Distant Location (provider location):  On-site  Platform used for Video Visit:  AmWell

## 2023-06-07 ENCOUNTER — PATIENT OUTREACH (OUTPATIENT)
Dept: CARE COORDINATION | Facility: CLINIC | Age: 88
End: 2023-06-07
Payer: COMMERCIAL

## 2023-06-07 ENCOUNTER — CARE COORDINATION (OUTPATIENT)
Dept: NEUROLOGY | Facility: CLINIC | Age: 88
End: 2023-06-07
Payer: COMMERCIAL

## 2023-06-07 NOTE — TELEPHONE ENCOUNTER
Ashley Lorenz and he stated his question is about EKG.  He stated pt had an EKG on March 31, 2023 at [facility]. Does pt need a new EKG as Dr. Ferraro has ordered?  Informed him Dr. Ferraro to review.

## 2023-06-07 NOTE — PROGRESS NOTES
Social Work Assessment and Intervention  [facility]     Data/Intervention:  Patient Name:  Mere Stacy  /Age:  1931 (89 year old)  Visit Type: telephone  Referral Source: Dr ANTONIO Ferraro  Reason for Referral:  Services to support Pt/family  Collaborated With:    -Pt's son Kelechi and his wife Halina    Psychosocial Data/Patient Concerns/Issues:  Living Situation:  Address:  [facility]  Permanent Living Situation:   Facility- [facility]isted [facility]  Family/Support Network:   Siblings and Children  Primary Caregiver:   Staff at [facility]  Her son Kelechi and Dtr in Law Halina    Financial/Insurance Info:  Payor: [employer] / Plan: [employer] MEDICARE / Product Type: HMO /   Insurance:   No Insurance issues identified  Sources of Income:   Social Security halfway and savings  Employment:   Retired    ADLs/IADLs/Mobility Info:  ADLs/Mobility:   [facility]- and the staff at the facility provides assistance with ADLs  IADLs:   Staff at the facility and Pt's son and wife      Memory Concerns/Diagnosis history: Moderate dementia with psychotic disturbance, unspecified dementia type (H)  Adjustment to Illness: poor and unaware  Caregiver Coping: Kelechi and Karlene are coping ok with the difficulty circumstances. They are worried about what could come next that will be difficult to deal with. It's hard for them to see her this way. She was independently living in her own home 3 months ago.     Advance Care Planning/Legal:  Decision Making:   Adult Children: Kelechi (only child)  Health Care Documents:   Declined completing- she never wanted to complete a health care directive or talk about end of life care.  We discussed the  policy in the absence of a document we would go to legal NOK for any medical decisions ONLY IF the patient is unable to make these decisions themselves.  They have completed a POLST at the facility.     Other Legal documents:   Will/Trust and Financial Power  of - Kelechi has financial POA    Pt (and her husb who  about 7 years ago) were always very secretive about their finances and personal affairs. (I.e. they wouldn't fill out the FAFSA for Kelechi's college application because they didn't want him to know about their financial situation)- per Kelechi    Intervention/Education/Resources Provided:  - Provided brief psychosocial assessment of patient/family coping, resource needs and current supports.  - Provided supportive listening, validation, information, coping strategies and brief counseling as needed.  - Provided information and facilitated linkage with community resources as needed.   - Screened for [facility] services.  - Collaborated with healthcare team and professionals in community to meet patient/family needs as appropriate.   - Addressed future care planning needs and questions and provided resource information  - Addressed importance of Advance Care planning documents and facilitated completion or obtaining copies for the medical record as needed.  - Provided information about how to access information about disease process and accessing support services thru Alzheimer's Association or other organizations.  -Discussed the [employer] store for ideas about items that might provide her some comfort or safety.   Assessment/Plan:  Kelechi and Halina are stressed but managing it ok. They are currently moving her things out of her old assisted living and into the new. They will need to sell her home. They are getting regular calls from the facility to help deal with her behaviors, like her refusal to take her medications. They visit her at the facility daily. They are hoping that the medication additions will help her with her agitation. Encouraged them to look at the [employer] website for ideas about how to deal with her agitation, such as acknowledging the feeling that could be behind her behaviors (anxiety, loss of control).   Kelechi is acting in the substituted  decision making role as there are no other siblings and she can't complete a health care directive and always refused to do one.   Encouraged self care and a support group if interested. Halina is aware of [facility] in their area that has caregiver support groups.  Strengths:   Has good family support and Has stable finances with savings. The staff at the facility have been good with her per family.    Concerns/Resource needs/Goals: Ongoing agitation is of concern, if medication and emotional reassurance don't help. Looking at other things to calm her such as music, TV (she used to watch [employer]), having a doll or something to do with her hands. They will think about those things and explore options.   Their goal is for her to be calm and peaceful.     Provided patient/family with contact information and availability and encouraged them to contact me again as needed.    KAMARI Ga, [facility]    [facility]  [address] [place] [address], [address] [place] [id number]    [phone]

## 2023-06-07 NOTE — TELEPHONE ENCOUNTER
Health Call Center    Phone Message    May a detailed message be left on voicemail: yes     Reason for Call: Other: Patients son Kelechi is calling to request medication orders for Seroquel and Exelon be faxed to [facility] at [phone] at [facility]. Please reach out to Nancy if needed at [phone].    Please contact Kelechi to discuss if needed at [phone]    Action Taken: Message routed to:  [facility] ([facility] Neurology    Travel Screening: Not Applicable

## 2023-06-07 NOTE — TELEPHONE ENCOUNTER
Called Kelechi and left message informing him Dr. Ferraro's note:  Yes, the EKG can take place in a month or two, following the start of her medications. Call back if he has further questions. Clinic # given.

## 2023-06-07 NOTE — TELEPHONE ENCOUNTER
Called Kelechi and left message informing him medication orders have been faxed to [facility] . Call back if any questions. Clinic # given.

## 2023-06-08 ENCOUNTER — TELEPHONE (OUTPATIENT)
Dept: NEUROLOGY | Facility: CLINIC | Age: 88
End: 2023-06-08
Payer: COMMERCIAL

## 2023-06-08 NOTE — TELEPHONE ENCOUNTER
I have attempted to contact this patient by phone with the following results: no answer.      Appointment type: Follow up  Provider: Dr. Ferraro   Return date: 3 months 9/6/23   Specialty phone number: [phone]  Additional appointment(s) needed: N/A  Additonal Notes: Unable to contact patient.  No vm, and No MyC , last attempt Letter sent.    Valencia Bedoya on 6/8/2023 at 3:07 PM

## 2023-08-12 ENCOUNTER — HEALTH MAINTENANCE LETTER (OUTPATIENT)
Age: 88
End: 2023-08-12

## 2023-08-30 ENCOUNTER — LAB REQUISITION (OUTPATIENT)
Dept: LAB | Facility: CLINIC | Age: 88
End: 2023-08-30
Payer: COMMERCIAL

## 2023-08-30 DIAGNOSIS — F39 UNSPECIFIED MOOD (AFFECTIVE) DISORDER (H): ICD-10-CM

## 2023-08-30 DIAGNOSIS — E03.9 HYPOTHYROIDISM, UNSPECIFIED: ICD-10-CM

## 2023-08-31 LAB
ANION GAP SERPL CALCULATED.3IONS-SCNC: 12 MMOL/L (ref 7–15)
BUN SERPL-MCNC: 20.4 MG/DL (ref 8–23)
BURR CELLS BLD QL SMEAR: SLIGHT
CALCIUM SERPL-MCNC: 9.3 MG/DL (ref 8.2–9.6)
CHLORIDE SERPL-SCNC: 98 MMOL/L (ref 98–107)
CREAT SERPL-MCNC: 0.83 MG/DL (ref 0.51–0.95)
DEPRECATED HCO3 PLAS-SCNC: 24 MMOL/L (ref 22–29)
ERYTHROCYTE [DISTWIDTH] IN BLOOD BY AUTOMATED COUNT: 13.4 % (ref 10–15)
GFR SERPL CREATININE-BSD FRML MDRD: 66 ML/MIN/1.73M2
GLUCOSE SERPL-MCNC: 72 MG/DL (ref 70–99)
HCT VFR BLD AUTO: 35.8 % (ref 35–47)
HGB BLD-MCNC: 12 G/DL (ref 11.7–15.7)
MCH RBC QN AUTO: 30.7 PG (ref 26.5–33)
MCHC RBC AUTO-ENTMCNC: 33.5 G/DL (ref 31.5–36.5)
MCV RBC AUTO: 92 FL (ref 78–100)
PLAT MORPH BLD: ABNORMAL
PLATELET # BLD AUTO: NORMAL 10*3/UL
POTASSIUM SERPL-SCNC: 4.3 MMOL/L (ref 3.4–5.3)
RBC # BLD AUTO: 3.91 10E6/UL (ref 3.8–5.2)
RBC MORPH BLD: ABNORMAL
SODIUM SERPL-SCNC: 134 MMOL/L (ref 136–145)
TSH SERPL DL<=0.005 MIU/L-ACNC: 4.91 UIU/ML (ref 0.3–4.2)
VIT B12 SERPL-MCNC: 331 PG/ML (ref 232–1245)
WBC # BLD AUTO: 6.5 10E3/UL (ref 4–11)

## 2023-08-31 PROCEDURE — 84443 ASSAY THYROID STIM HORMONE: CPT | Mod: ORL | Performed by: FAMILY MEDICINE

## 2023-08-31 PROCEDURE — 36415 COLL VENOUS BLD VENIPUNCTURE: CPT | Mod: ORL | Performed by: FAMILY MEDICINE

## 2023-08-31 PROCEDURE — P9603 ONE-WAY ALLOW PRORATED MILES: HCPCS | Mod: ORL | Performed by: FAMILY MEDICINE

## 2023-08-31 PROCEDURE — 85014 HEMATOCRIT: CPT | Mod: ORL | Performed by: FAMILY MEDICINE

## 2023-08-31 PROCEDURE — 80048 BASIC METABOLIC PNL TOTAL CA: CPT | Mod: ORL | Performed by: FAMILY MEDICINE

## 2023-08-31 PROCEDURE — 82607 VITAMIN B-12: CPT | Mod: ORL | Performed by: FAMILY MEDICINE

## 2023-09-15 ENCOUNTER — VIRTUAL VISIT (OUTPATIENT)
Dept: NEUROLOGY | Facility: CLINIC | Age: 88
End: 2023-09-15
Payer: COMMERCIAL

## 2023-09-15 VITALS — BODY MASS INDEX: 24.17 KG/M2 | HEIGHT: 61 IN | WEIGHT: 128 LBS

## 2023-09-15 DIAGNOSIS — F03.B2 MODERATE DEMENTIA WITH PSYCHOTIC DISTURBANCE, UNSPECIFIED DEMENTIA TYPE (H): Primary | ICD-10-CM

## 2023-09-15 PROCEDURE — 99215 OFFICE O/P EST HI 40 MIN: CPT | Mod: VID | Performed by: PSYCHIATRY & NEUROLOGY

## 2023-09-15 ASSESSMENT — PAIN SCALES - GENERAL: PAINLEVEL: NO PAIN (0)

## 2023-09-15 NOTE — NURSING NOTE
Is the patient currently in the state of [place]? YES    Visit mode:VIDEO    If the visit is dropped, the patient can be reconnected by: VIDEO VISIT: Send to e-mail at: [email or web address]    Will anyone else be joining the visit? NO  (If patient encounters technical issues they should call [phone])    How would you like to obtain your AVS? MyChart    Are changes needed to the allergy or medication list? Pt stated no changes to allergies and Pt stated no med changes    Reason for visit: ALICIA SALINASF

## 2023-09-15 NOTE — LETTER
9/15/2023       RE: Mere Stacy  [address]  [facility] [address]       Dear Colleague,    Thank you for referring your patient, Mere Stacy, to the [facility] [place] at [facility]. Please see a copy of my visit note below.    [facility] Neurology Follow Up Visit    Mere Stacy MRN# [id number]   Age: 89 year old YOB: 1931     Brief history of symptoms: The patient was initially seen in neurologic consultation on 6/6/2023 for evaluation of dementia. Please see the comprehensive neurologic consultation notes from those dates in the Epic records for details.     Overall impression was that the patient had a major neurocognitive deficit, likely from Alzheimer's disease, leading to dementia as well as associated psychosis/agitation.  She was to trial rivastigmine, start Seroquel following EKG to avoid prolonged QT interval issue, and have a  involved in a visit with the patient.    Today, Samia is feeling good at this time.  She names her son correctly this time and her daughter in-law.  She has been taking Seroquel and rivastigmine.  Since starting medications, her hallucinations are not being reported and she is feeling more aware of everything.  They feel she is back to near normal, or at least near normal compared to our last visit.  There have been no passing out events, or seizure like activity.     Physical Exam:   General: Seated comfortably in no acute distress.  Looks quite well, and is non-tangential today.   Neurologic:     Mental Status: Fully alert, attentive and oriented (names month, date, year, and day of week easily). Names her son correctly. Serial 7's to two answers. States 9/11 occurred in discontinue with two towers getting hit by planes. Clock is circular, with correct number and hand placement. Speech clear and fluent, no paraphasic errors.     Cranial Nerves: EOMI with  normal smooth pursuit. Facial movements symmetric. Hearing not formally tested but intact to conversation.  No dysarthria.     Motor: No tremors or other abnormal movements observed.          Assessment and Plan:   Assessment:  Cognitive impairment, suspect dementing process like Alzhiemer's or DLB    The patient has had such a dramatic improvement with use of rivastigmine and seroquel it calls into question the initial thought related to her diagnosis, as I would not expect this degree of change with these medications.  There are some super-responders to rivastigmine though, and perhaps her sleep and stable living environment are playing a role. In any case, I think she could undergo further cognitive testing given this improvement, but I am unsure if she could tolerate neuropsychology testing.      Plan:  OT for CPT  OT for MoCA  Seroquel 25 mg at bedtime  Rivastigmine 3 mg BID      Follow up in Neurology clinic in 2 months or earlier as needed should new concerns arise.      Total time today (45 min) in this patient encounter was spent on pre-charting, counseling and/or coordination of care.           Again, thank you for allowing me to participate in the care of your patient.      Sincerely,    Reinaldo Ferraro, DO

## 2023-09-15 NOTE — PROGRESS NOTES
[facility] Neurology Follow Up Visit    Mere Stacy MRN# [id number]   Age: 89 year old YOB: 1931     Brief history of symptoms: The patient was initially seen in neurologic consultation on 6/6/2023 for evaluation of dementia. Please see the comprehensive neurologic consultation notes from those dates in the Epic records for details.     Overall impression was that the patient had a major neurocognitive deficit, likely from Alzheimer's disease, leading to dementia as well as associated psychosis/agitation.  She was to trial rivastigmine, start Seroquel following EKG to avoid prolonged QT interval issue, and have a  involved in a visit with the patient.    Today, Samia is feeling good at this time.  She names her son correctly this time and her daughter in-law.  She has been taking Seroquel and rivastigmine.  Since starting medications, her hallucinations are not being reported and she is feeling more aware of everything.  They feel she is back to near normal, or at least near normal compared to our last visit.  There have been no passing out events, or seizure like activity.     Physical Exam:   General: Seated comfortably in no acute distress.  Looks quite well, and is non-tangential today.   Neurologic:     Mental Status: Fully alert, attentive and oriented (names month, date, year, and day of week easily). Names her son correctly. Serial 7's to two answers. States 9/11 occurred in discontinue with two towers getting hit by planes. Clock is circular, with correct number and hand placement. Speech clear and fluent, no paraphasic errors.     Cranial Nerves: EOMI with normal smooth pursuit. Facial movements symmetric. Hearing not formally tested but intact to conversation.  No dysarthria.     Motor: No tremors or other abnormal movements observed.          Assessment and Plan:   Assessment:  Cognitive impairment, suspect dementing process like Alzhiemer's or DLB    The patient has had  such a dramatic improvement with use of rivastigmine and seroquel it calls into question the initial thought related to her diagnosis, as I would not expect this degree of change with these medications.  There are some super-responders to rivastigmine though, and perhaps her sleep and stable living environment are playing a role. In any case, I think she could undergo further cognitive testing given this improvement, but I am unsure if she could tolerate neuropsychology testing.      Plan:  OT for CPT  OT for MoCA  Seroquel 25 mg at bedtime  Rivastigmine 3 mg BID      Follow up in Neurology clinic in 2 months or earlier as needed should new concerns arise.    COLLIN Ferraro D.O.   of Neurology    Total time today (45 min) in this patient encounter was spent on pre-charting, counseling and/or coordination of care.     Virtual Visit Details    Type of service:  Video Visit   Video Start Time: 1:45 PM  Video End Time:2:20 PM    Originating Location (pt. Location): Home    Distant Location (provider location):  On-site  Platform used for Video Visit: Miladys

## 2023-09-21 ENCOUNTER — THERAPY VISIT (OUTPATIENT)
Dept: OCCUPATIONAL THERAPY | Facility: REHABILITATION | Age: 88
End: 2023-09-21
Attending: PSYCHIATRY & NEUROLOGY
Payer: COMMERCIAL

## 2023-09-21 DIAGNOSIS — F03.B2 MODERATE DEMENTIA WITH PSYCHOTIC DISTURBANCE, UNSPECIFIED DEMENTIA TYPE (H): ICD-10-CM

## 2023-09-21 DIAGNOSIS — Z78.9 DECREASED ACTIVITIES OF DAILY LIVING (ADL): Primary | ICD-10-CM

## 2023-09-21 PROCEDURE — 96125 COGNITIVE TEST BY HC PRO: CPT | Mod: GO | Performed by: OCCUPATIONAL THERAPIST

## 2023-09-21 NOTE — PROGRESS NOTES
"OCCUPATIONAL THERAPY EVALUATION  Type of Visit: CPT    See electronic medical record for Abuse and Falls Screening details.      Presenting condition or subjective complaint: Referral from Dr. Ferraro for more information about cognitive ability.  Per neurology visit note on 9-15-23, \"The patient has had such a dramatic improvement with use of rivastigmine and seroquel it calls into question the initial thought related to her diagnosis, as I would not expect this degree of change with these medications. There are some super-responders to rivastigmine though, and perhaps her sleep and stable living environment are playing a role. In any case, I think she could undergo further cognitive testing given this improvement, but I am unsure if she could tolerate neuropsychology testing.\"   Date of onset: 09/15/23 (date of referral to OT)    Relevant medical history: Arthritis; High blood pressure; Thyroid problems   Dates & types of surgery: Gall bladder, hysterectomy    Prior diagnostic imaging/testing results: CT scan     Prior therapy history for the same diagnosis, illness or injury: No      Prior Level of Function- Patient's level of function has fluctuated over the past year    Living Environment  Social support: With a caregiver/helper   Type of home: Assisted Living , memory care  Stairs to enter the home: No       Ramp: No   Stairs inside the home: No       Help at home: Medication and/or finances; Meals on wheels/meal service; Emergency call system  Equipment owned: Four-point cane; Walker; Grab bars; Raised toilet seat; Bath bench; Lift chair     Employment: No    Hobbies/Interests: Music, [employer]    Patient goals for therapy:  \"to see how my thinking is\"    Pain assessment: Pain denied     Objective       Previous cognitive screens completed in OT or by Physician and score: None    Functional History Interview:    Informants: Patient      Client s perception of memory/ thinking or functional " difficulties: Patient is aware that she was having difficulty and that the medication has improved her thinking significantly     Living situation: Patient states that she lives in memory care     Home maintenance activities: Patient reports that the facility takes care of the cleaning. Her daughter takes care of the laundry.     Meal preparation and grocery shopping: Patient reports that the facility takes care of all the meals     Finances and paying bills: Patient states that her son and daughter in law manage her finances     Medication management: Medication is managed and administered by the facility     Driving and transportation: Patient is no longer driving     Leisure and routine activities: Patient enjoys reading, listening to CD's     The [place] Cognitive Assessment (MoCA) was designed as a rapid screening instrument for mild cognitive dysfunction. It assesses different cognitive domains: attention and concentration, executive functions, memory, language, visuoconstructional skills, conceptual thinking, calculations, and orientation. The total possible score is 30 points; a score of 26 or above is considered normal. Patient scored 21/30 with deficits observed in visuospatial(-2),  attention(-2), language fluency(-2), abstraction(-1), delayed recall (3/5 recalled after delay).  Patient was educated on results and verbalized understanding.        Cognitive Performance Test    SUMMARY OF TEST:    The Cognitive Performance Test (CPT) is a standardized performance-based assessment to measure working memory/executive function processing capacities that underlie functional performance. Subtasks include common basic and instrumental activities of daily living (ADL/IADL) which are rated based on the manner in which patients respond to task demands of varying complexity. The total CPT score describes a level of functioning that indicates how information is processed, implications for functional activities,  potential safety risks and a recommended level of supervision or assist based on cognitive function. The highest total score on this test is in the range of 5.6 to 5.8.    DATE OF TESTIN23    RESULTS OF TESTING:                                                                                         CPT Subtest Results    MEDBOX:  SHOP/GLOVES: 5 PHONE: 4.5/6   WASH:    TOAST: 4/   TOTAL CPT SCORE:  23.5/28     Average CPT Score  4.7/5.6    INTERPRETATION OF TEST RESULTS:    Based on the Cognitive Performance Test, this patient scored at CPT Level 4.7.  See CPT Levels reference below.    Summary of functional cognitive status:   Based on the CPT results, MoCA results and functional interview with the patient, patient is functioning with mild to moderate functional decline. Patient's at this level often show significant difficulty with completion of complex daily tasks and the start of difficulty with self care tasks. Shows significant decline with memory, multi-tasking and solving problems. May show decreased quality or initiation of self cares. Patient may or may not be aware of their own deficits and may be able to speak better than they can actually perform. May learn new information with repetition.    Factors affecting performance:  No additional problems noted    Recommendations:  Patient is no longer driving.   Assist for ADL/IADL:  Meal preparation, Cleaning, Laundry, Shopping, Finances, and Medication management  Supervision for ADL/IADL:  ADL  Supervision in living setting:  Daily checks                                                       TIME FOR Functional History Interview: 20 minutes    TIME ADMINISTERING TEST: 45 minutes    TIME FOR INTERPRETATION AND PREPARATION OF REPORT: 15 minutes    TOTAL TIME: 80 minutes      CPT Levels Reference:    Patient's Average CPT Score:  4.7                                                                                                                    "                               Individual scores range along a continuum as outlined below.  In addition to cognitive status, other factors may affect safety in a home environment.  Please refer to specific recommendations for this patient.    ___5.6-5.8  Normal functioning (absence of cognitive-functional disability).  Independent in managing personal affairs, monitors and directs own behavior.  Uses complex information to carry out daily activities with safety and accuracy.    Proficient with instrumental activities of daily living (IADL) and learning new activity.  Problems are anticipated, errors are avoided, and consequences of actions are considered.      ___5.0   Mild cognitive-functional disability; deficits in working memory and executive thought processes. Difficulty using complex information. Problems may be observed with recent memory, judgment, reasoning and planning ahead. May be impulsive or have difficulty anticipating consequences.  Safety:  May require assistance to plan ahead; or to manage complex medication schedules, appointments or finances.  Hazardous activities may need to be monitored or limited.  ADL:  Mild functional decline.  Able to complete basic self-care and routine household tasks.  May have difficulty with complex daily tasks such as reading, writing, meal preparation, shopping or driving.   Learns through hands on teaching. Self-centered behavior or difficulty considering the needs of others may be seen related to trouble seeing the  whole picture\". Can appear disorganized or uninhibited.    _X__4.5  Mild to moderate cognitive-functional disability. Significant deficits in working memory and executive thought processes. Judgment, reasoning and planning show obvious impairment.  Distractible with inability to shift attention/actions given competing stimuli.  Difficulty with problem solving and managing details. Complex daily tasks performed with inconsistency, difficulty, or error.  "    Safety:  Medications should be monitored, stove use may require supervision, and driving ability may be affected.  Impaired safety awareness with inability to anticipate potential problems.  May not recognize or respond to emergent situations. Requires frequent check-in support.   ADL:  Mild difficulty with simple everyday self-care tasks. Benefits from structured, routine activity.  Will likely need reminders to complete tasks outside of the routine. Requires assistance with planning and IADL tasks like shopping and finances. Learns concrete tasks through repetition, but performance may not generalize. Tends to be impulsive with poor insight. Self centered behavior or inability to consider the needs of others is common.    ___4.0  Moderate cognitive-functional disability; abstract to concrete thought processes. Working memory and executive function impairments are obvious. Difficulty with planning and problem solving.  Behavior is goal-directed, but unable to follow multi-step directions, is easily distracted, and may not recognize mistakes.  Inability to anticipate hazards or understand precautions.  Safety:  Recommend 24-hour supervision for safety. Supervision needed for medication management and for hazardous activities. May not be able to follow a restricted diet. Can get lost in unfamiliar surroundings. Generally, persons functioning at level 4 should not be driving.   ADL:  Some decline in quality or frequency of ADL.  [place] enhanced by use of a routine, simple concrete directions, and caregiver set-up of needed items. Complex tasks such as money or home management typically requires assistance.  Relies heavily on vision to guide behavior; will ignore objects/hazards not in plain sight and can be distracted by irrelevant objects. Often has poor insight.  Able to carry out social conversation and may verbally  cover  for deficits leading caregivers to believe they are capable of functioning  independently.       ___3.5  Moderate cognitive-functional disability; increased cues needed for task completion. Aware of concrete task steps but needs prompting or cues to initiate and complete simple tasks. Attention span is limited, simple directions may need to be repeated, and re-focus to a topic or task may be required.  Safety:  24-hour supervision required for safety and for assistance with daily tasks. Assistance required with medications, and access to medication should be limited. Meals, nutrition and dietary restrictions need to be monitored.  All hazardous activities should be restricted or supervised. Should not drive. Prone to wandering and can become lost.  ADL:  Moderate functional decline. Familiar tasks usually requires set-up of supplies and directions to complete steps. May need objects handed to them for task initiation. Function best with a set schedule in familiar surroundings with familiar people. All complex tasks must be done by others. Vocabulary is diminished and speech often unfocused.           Assessment & Plan   CLINICAL IMPRESSIONS  Medical Diagnosis: Moderate dementia with psychotic disturbance, unspecified dementia type (H)    Treatment Diagnosis: impaired ADL and IADL's    Impression/Assessment: CPT completed. See above for details and summary.     Clinical Decision Making (Complexity):  Assessment of Occupational Performance: 1-3 Performance Deficits  Occupational Performance Limitations: care of others, driving and community mobility, health management and maintenance, and home establishment and management  Clinical Decision Making (Complexity): Low complexity    PLAN OF CARE  Treatment Interventions:  Interventions: Standardized Testing    Long Term Goals  N/A, evaluation only         Frequency of Treatment:   N/A, evaluation only  Duration of Treatment:   N/A, evaluation only    Recommended Referrals to Other Professionals: no  Education Assessment: Learner/Method:  Patient     Risks and benefits of evaluation/treatment have been explained.   Patient agrees with Plan of Care.     Evaluation Time:          Signing Clinician: Janice Song OT

## 2023-09-25 ENCOUNTER — TELEPHONE (OUTPATIENT)
Dept: NEUROLOGY | Facility: CLINIC | Age: 88
End: 2023-09-25
Payer: COMMERCIAL

## 2023-09-25 NOTE — TELEPHONE ENCOUNTER
Left Voicemail (1st Attempt) for the patient to call back and schedule the following:    Appointment type: Video Visit Return  Provider: Jasmine  Return date: 11/15/23 or close  Specialty phone number: [phone]    Edit: 12/1/23 at 2:45pm is being held for her    LVM Sent MyC    Amaya Chapman on 9/25/2023 at 2:34 PM

## 2023-12-01 ENCOUNTER — VIRTUAL VISIT (OUTPATIENT)
Dept: NEUROLOGY | Facility: CLINIC | Age: 88
End: 2023-12-01
Payer: COMMERCIAL

## 2023-12-01 DIAGNOSIS — F03.B2 MODERATE DEMENTIA WITH PSYCHOTIC DISTURBANCE, UNSPECIFIED DEMENTIA TYPE (H): Primary | ICD-10-CM

## 2023-12-01 PROCEDURE — 99214 OFFICE O/P EST MOD 30 MIN: CPT | Mod: 95 | Performed by: PSYCHIATRY & NEUROLOGY

## 2023-12-01 ASSESSMENT — PAIN SCALES - GENERAL: PAINLEVEL: NO PAIN (0)

## 2023-12-01 NOTE — PROGRESS NOTES
[facility] Neurology Follow Up Visit    Mere Stacy MRN# [id number]   Age: 89 year old YOB: 1931     Brief history of symptoms: The patient was initially seen in neurologic consultation on 6/6/2023 and most recently 9/15/2023 for evaluation of dementia. Please see the comprehensive neurologic consultation notes from those dates in the Epic records for details.     The patient is thought to have a likely dementing process such as Alzheimer's or DLB but in a state of mild to moderate cognitive impairment.  She had an excellent response to rivastigmine (super-responder?) and was to continue with plans to obtain both a MoCA and CPT with OT in the future.  Hallucinations of the past were reduced greatly with rivastigmine and seroquel.    Interval history:   - OT evaluation 9/21/2023 showed a CPT score of 4.7/5.6 with notable recommendation for her to not drive (she has stopped in the past, and have some assistance with issues like meal prep/cleaning/laundry/shopping/finances/medication management.  - OT evaluation 9/21/2023 for MoCA showed a score of 21/30 with deficits in visualspatial, language fluency, abstraction, and delayed recall (3/5).    Today, the patient is here with her son and daughter in-law. She is excited about paula. She sleeping fully at night (sleeping at 9 and waking up around 6), and attends activities often.  She is getting help with management of her medications at her living facility. There are ongoing vivid dreams but not visual hallucinations or delusions. She isn't having delusions at this time.  She does have diarrhea which is longstanding, often associated with high fat foods.      Physical Exam:   General: Seated comfortably in no acute distress.  Neurologic:     Mental Status: Fully alert, attentive and oriented. Speech clear and fluent, no paraphasic errors.     Cranial Nerves: EOMI with normal smooth pursuit. Facial movements symmetric. Hearing not formally tested but  intact to conversation.  No dysarthria.     Motor: No tremors or other abnormal movements observed.          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  Her visual hallucinations are gone at this time, but I suspect this could have been an acute response to some outside stimulus over a part of her dementia.   At this point, she is responding well to rivastigmine, but I am uncertain if Seroquel is still required to treat visual hallucinations.  I think she could wean off of this medication and other antipsychotics if possible.      Plan:  - Rivastigmine 3 mg BID  - need to reconcile medications   - Seroquel 25 mg BID was previously prescribed by me but I cannot confirm this is still continuing upon chart review or conversation with patient or her family   - Risperdal is on her medication list as new, but I cannot confirm who started this medication or if it is actually active    Follow up in Neurology clinic in one year, or earlier as needed should new concerns arise.    COLLIN Ferraro D.O.   of Neurology    Total time today (30 min) in this patient encounter was spent on pre-charting, counseling and/or coordination of care.

## 2023-12-01 NOTE — PROGRESS NOTES
Virtual Visit Details    Type of service:  Video Visit   Video Start Time:  245  Video End Time:3:10 PM    Originating Location (pt. Location): Home    Distant Location (provider location):  On-site  Platform used for Video Visit: Miladys

## 2023-12-01 NOTE — Clinical Note
I need your help.  Could you help with reconciling this patient's drug list with her current living facility. I can not confirm with her or her family if she is actively taking Seroquel or actively taking risperidone or both.  Thanks, COLLIN

## 2023-12-01 NOTE — LETTER
12/1/2023       RE: Mere Stacy  [address]  [facility] [address]       Dear Colleague,    Thank you for referring your patient, Mere Stacy, to the [facility] NEUROLOG[facility] [place] at [facility]. Please see a copy of my visit note below.    [facility] Neurology Follow Up Visit    Mere Stacy MRN# [id number]   Age: 89 year old YOB: 1931     Brief history of symptoms: The patient was initially seen in neurologic consultation on 6/6/2023 and most recently 9/15/2023 for evaluation of dementia. Please see the comprehensive neurologic consultation notes from those dates in the Epic records for details.     The patient is thought to have a likely dementing process such as Alzheimer's or DLB but in a state of mild to moderate cognitive impairment.  She had an excellent response to rivastigmine (super-responder?) and was to continue with plans to obtain both a MoCA and CPT with OT in the future.  Hallucinations of the past were reduced greatly with rivastigmine and seroquel.    Interval history:   - OT evaluation 9/21/2023 showed a CPT score of 4.7/5.6 with notable recommendation for her to not drive (she has stopped in the past, and have some assistance with issues like meal prep/cleaning/laundry/shopping/finances/medication management.  - OT evaluation 9/21/2023 for MoCA showed a score of 21/30 with deficits in visualspatial, language fluency, abstraction, and delayed recall (3/5).    Today, the patient is here with her son and daughter in-law. She is excited about paula. She sleeping fully at night (sleeping at 9 and waking up around 6), and attends activities often.  She is getting help with management of her medications at her living facility. There are ongoing vivid dreams but not visual hallucinations or delusions. She isn't having delusions at this time.  She does have diarrhea which is longstanding, often associated  with high fat foods.      Physical Exam:   General: Seated comfortably in no acute distress.  Neurologic:     Mental Status: Fully alert, attentive and oriented. Speech clear and fluent, no paraphasic errors.     Cranial Nerves: EOMI with normal smooth pursuit. Facial movements symmetric. Hearing not formally tested but intact to conversation.  No dysarthria.     Motor: No tremors or other abnormal movements observed.          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  Her visual hallucinations are gone at this time, but I suspect this could have been an acute response to some outside stimulus over a part of her dementia.   At this point, she is responding well to rivastigmine, but I am uncertain if Seroquel is still required to treat visual hallucinations.  I think she could wean off of this medication and other antipsychotics if possible.      Plan:  - Rivastigmine 3 mg BID  - need to reconcile medications   - Seroquel 25 mg BID was previously prescribed by me but I cannot confirm this is still continuing upon chart review or conversation with patient or her family   - Risperdal is on her medication list as new, but I cannot confirm who started this medication or if it is actually active    Follow up in Neurology clinic in one year, or earlier as needed should new concerns arise.      Total time today (30 min) in this patient encounter was spent on pre-charting, counseling and/or coordination of care.           Again, thank you for allowing me to participate in the care of your patient.      Sincerely,    Reinaldo Ferraro, DO

## 2023-12-04 ENCOUNTER — CARE COORDINATION (OUTPATIENT)
Dept: NEUROLOGY | Facility: CLINIC | Age: 88
End: 2023-12-04
Payer: COMMERCIAL

## 2023-12-04 NOTE — PROGRESS NOTES
I called the patient's memory care facility and spoke to a nurse regarding her medications.  Patient is currently taking seroquel 25 mg at bedtime and 0.25 risperidone two times daily.  Her nurse told me that she is doing really well on this dose and does not want to change anything.    Update sent to Dr Ferraro

## 2023-12-09 NOTE — PROGRESS NOTES
DISCHARGE  Reason for Discharge: Patient has met all goals.    Equipment Issued: none    Discharge Plan: See CPT report    Referring Provider:  Reinaldo Ferraro MD       09/21/23 0500   Appointment Info   Treating Provider Dimple Song OTR/L   Medical Diagnosis Moderate dementia with psychotic disturbance, unspecified dementia type (H)   OT Tx Diagnosis impaired ADL and IADL's   Progress Note/Certification   Onset of Illness/Injury or Date of Surgery 09/15/23  (date of referral to OT)   Eval/Assessments   Standardized Cognitive Testing Minutes (40847) 80 Minutes   Education   Learner/Method Patient   Plan   Plan for next session CPT completed. See separate report for details   Total Session Time   Timed Code Treatment Minutes 80   Total Treatment Time (sum of timed and untimed services) 80

## 2024-03-20 ENCOUNTER — LAB REQUISITION (OUTPATIENT)
Dept: LAB | Facility: CLINIC | Age: 89
End: 2024-03-20
Payer: COMMERCIAL

## 2024-03-20 DIAGNOSIS — I10 ESSENTIAL (PRIMARY) HYPERTENSION: ICD-10-CM

## 2024-03-20 DIAGNOSIS — E03.9 HYPOTHYROIDISM, UNSPECIFIED: ICD-10-CM

## 2024-03-21 LAB
ERYTHROCYTE [DISTWIDTH] IN BLOOD BY AUTOMATED COUNT: 14.1 % (ref 10–15)
HCT VFR BLD AUTO: 33.9 % (ref 35–47)
HGB BLD-MCNC: 11.5 G/DL (ref 11.7–15.7)
MCH RBC QN AUTO: 30.8 PG (ref 26.5–33)
MCHC RBC AUTO-ENTMCNC: 33.9 G/DL (ref 31.5–36.5)
MCV RBC AUTO: 91 FL (ref 78–100)
PLATELET # BLD AUTO: 158 10E3/UL (ref 150–450)
RBC # BLD AUTO: 3.73 10E6/UL (ref 3.8–5.2)
WBC # BLD AUTO: 6.5 10E3/UL (ref 4–11)

## 2024-03-21 PROCEDURE — 80048 BASIC METABOLIC PNL TOTAL CA: CPT | Mod: ORL | Performed by: PHYSICIAN ASSISTANT

## 2024-03-21 PROCEDURE — P9604 ONE-WAY ALLOW PRORATED TRIP: HCPCS | Mod: ORL | Performed by: PHYSICIAN ASSISTANT

## 2024-03-21 PROCEDURE — 36415 COLL VENOUS BLD VENIPUNCTURE: CPT | Mod: ORL | Performed by: PHYSICIAN ASSISTANT

## 2024-03-21 PROCEDURE — 84443 ASSAY THYROID STIM HORMONE: CPT | Mod: ORL | Performed by: PHYSICIAN ASSISTANT

## 2024-03-21 PROCEDURE — 85027 COMPLETE CBC AUTOMATED: CPT | Mod: ORL | Performed by: PHYSICIAN ASSISTANT

## 2024-03-22 LAB
ANION GAP SERPL CALCULATED.3IONS-SCNC: 11 MMOL/L (ref 7–15)
BUN SERPL-MCNC: 19.4 MG/DL (ref 8–23)
CALCIUM SERPL-MCNC: 9.1 MG/DL (ref 8.2–9.6)
CHLORIDE SERPL-SCNC: 97 MMOL/L (ref 98–107)
CREAT SERPL-MCNC: 0.91 MG/DL (ref 0.51–0.95)
DEPRECATED HCO3 PLAS-SCNC: 25 MMOL/L (ref 22–29)
EGFRCR SERPLBLD CKD-EPI 2021: 59 ML/MIN/1.73M2
GLUCOSE SERPL-MCNC: 82 MG/DL (ref 70–99)
POTASSIUM SERPL-SCNC: 4.8 MMOL/L (ref 3.4–5.3)
SODIUM SERPL-SCNC: 133 MMOL/L (ref 135–145)
TSH SERPL DL<=0.005 MIU/L-ACNC: 6.28 UIU/ML (ref 0.3–4.2)

## 2024-04-24 ENCOUNTER — LAB REQUISITION (OUTPATIENT)
Dept: LAB | Facility: CLINIC | Age: 89
End: 2024-04-24
Payer: COMMERCIAL

## 2024-04-24 DIAGNOSIS — I10 ESSENTIAL (PRIMARY) HYPERTENSION: ICD-10-CM

## 2024-04-25 PROCEDURE — 80048 BASIC METABOLIC PNL TOTAL CA: CPT | Mod: ORL | Performed by: FAMILY MEDICINE

## 2024-04-25 PROCEDURE — P9604 ONE-WAY ALLOW PRORATED TRIP: HCPCS | Mod: ORL | Performed by: FAMILY MEDICINE

## 2024-04-25 PROCEDURE — 36415 COLL VENOUS BLD VENIPUNCTURE: CPT | Mod: ORL | Performed by: FAMILY MEDICINE

## 2024-04-26 LAB
ANION GAP SERPL CALCULATED.3IONS-SCNC: 9 MMOL/L (ref 7–15)
BUN SERPL-MCNC: 18 MG/DL (ref 8–23)
CALCIUM SERPL-MCNC: 9.4 MG/DL (ref 8.2–9.6)
CHLORIDE SERPL-SCNC: 98 MMOL/L (ref 98–107)
CREAT SERPL-MCNC: 0.86 MG/DL (ref 0.51–0.95)
DEPRECATED HCO3 PLAS-SCNC: 26 MMOL/L (ref 22–29)
EGFRCR SERPLBLD CKD-EPI 2021: 63 ML/MIN/1.73M2
GLUCOSE SERPL-MCNC: 83 MG/DL (ref 70–99)
POTASSIUM SERPL-SCNC: 4.3 MMOL/L (ref 3.4–5.3)
SODIUM SERPL-SCNC: 133 MMOL/L (ref 135–145)

## 2024-06-03 ENCOUNTER — TELEPHONE (OUTPATIENT)
Dept: NEUROLOGY | Facility: CLINIC | Age: 89
End: 2024-06-03
Payer: COMMERCIAL

## 2024-06-03 NOTE — TELEPHONE ENCOUNTER
Left Voicemail (1st Attempt) and Sent Mychart (1st Attempt) for the patient to call back and schedule the following:    Appointment type: One Year F/U Virtual  Provider: Dr. Ferraro  Return date: Dec. 2024  Specialty phone number: [phone]  Additional appointment(s) needed: EKG  Additonal Notes:

## 2024-09-29 ENCOUNTER — HEALTH MAINTENANCE LETTER (OUTPATIENT)
Age: 89
End: 2024-09-29

## 2024-11-20 ENCOUNTER — LAB REQUISITION (OUTPATIENT)
Dept: LAB | Facility: CLINIC | Age: 89
End: 2024-11-20
Payer: COMMERCIAL

## 2024-11-20 DIAGNOSIS — I10 ESSENTIAL (PRIMARY) HYPERTENSION: ICD-10-CM

## 2024-11-20 DIAGNOSIS — E03.9 HYPOTHYROIDISM, UNSPECIFIED: ICD-10-CM

## 2024-11-21 LAB
ANION GAP SERPL CALCULATED.3IONS-SCNC: 11 MMOL/L (ref 7–15)
BUN SERPL-MCNC: 22.3 MG/DL (ref 8–23)
CALCIUM SERPL-MCNC: 9.7 MG/DL (ref 8.8–10.4)
CHLORIDE SERPL-SCNC: 93 MMOL/L (ref 98–107)
CREAT SERPL-MCNC: 1.05 MG/DL (ref 0.51–0.95)
EGFRCR SERPLBLD CKD-EPI 2021: 49 ML/MIN/1.73M2
ERYTHROCYTE [DISTWIDTH] IN BLOOD BY AUTOMATED COUNT: 13.2 % (ref 10–15)
GLUCOSE SERPL-MCNC: 109 MG/DL (ref 70–99)
HCO3 SERPL-SCNC: 25 MMOL/L (ref 22–29)
HCT VFR BLD AUTO: 34.1 % (ref 35–47)
HGB BLD-MCNC: 11.6 G/DL (ref 11.7–15.7)
MCH RBC QN AUTO: 31.4 PG (ref 26.5–33)
MCHC RBC AUTO-ENTMCNC: 34 G/DL (ref 31.5–36.5)
MCV RBC AUTO: 92 FL (ref 78–100)
PLATELET # BLD AUTO: 176 10E3/UL (ref 150–450)
POTASSIUM SERPL-SCNC: 4.3 MMOL/L (ref 3.4–5.3)
RBC # BLD AUTO: 3.7 10E6/UL (ref 3.8–5.2)
SODIUM SERPL-SCNC: 129 MMOL/L (ref 135–145)
TSH SERPL DL<=0.005 MIU/L-ACNC: 3.43 UIU/ML (ref 0.3–4.2)
WBC # BLD AUTO: 6.8 10E3/UL (ref 4–11)

## 2024-11-21 PROCEDURE — P9604 ONE-WAY ALLOW PRORATED TRIP: HCPCS | Mod: ORL | Performed by: FAMILY MEDICINE

## 2024-11-21 PROCEDURE — 36415 COLL VENOUS BLD VENIPUNCTURE: CPT | Mod: ORL | Performed by: FAMILY MEDICINE

## 2024-11-21 PROCEDURE — 85027 COMPLETE CBC AUTOMATED: CPT | Mod: ORL | Performed by: FAMILY MEDICINE

## 2024-11-21 PROCEDURE — 84443 ASSAY THYROID STIM HORMONE: CPT | Mod: ORL | Performed by: FAMILY MEDICINE

## 2024-11-21 PROCEDURE — 80048 BASIC METABOLIC PNL TOTAL CA: CPT | Mod: ORL | Performed by: FAMILY MEDICINE

## 2024-12-18 ENCOUNTER — LAB REQUISITION (OUTPATIENT)
Dept: LAB | Facility: CLINIC | Age: 89
End: 2024-12-18
Payer: COMMERCIAL

## 2024-12-18 DIAGNOSIS — E87.1 HYPO-OSMOLALITY AND HYPONATREMIA: ICD-10-CM

## 2024-12-18 DIAGNOSIS — N18.31 CHRONIC KIDNEY DISEASE, STAGE 3A (H): ICD-10-CM

## 2024-12-19 LAB
ANION GAP SERPL CALCULATED.3IONS-SCNC: 10 MMOL/L (ref 7–15)
BUN SERPL-MCNC: 28.8 MG/DL (ref 8–23)
CALCIUM SERPL-MCNC: 9.2 MG/DL (ref 8.8–10.4)
CHLORIDE SERPL-SCNC: 97 MMOL/L (ref 98–107)
CREAT SERPL-MCNC: 1.15 MG/DL (ref 0.51–0.95)
EGFRCR SERPLBLD CKD-EPI 2021: 44 ML/MIN/1.73M2
GLUCOSE SERPL-MCNC: 109 MG/DL (ref 70–99)
HCO3 SERPL-SCNC: 24 MMOL/L (ref 22–29)
POTASSIUM SERPL-SCNC: 4.6 MMOL/L (ref 3.4–5.3)
SODIUM SERPL-SCNC: 131 MMOL/L (ref 135–145)

## 2024-12-19 PROCEDURE — 80048 BASIC METABOLIC PNL TOTAL CA: CPT | Mod: ORL | Performed by: FAMILY MEDICINE

## 2024-12-19 PROCEDURE — P9603 ONE-WAY ALLOW PRORATED MILES: HCPCS | Mod: ORL | Performed by: FAMILY MEDICINE

## 2024-12-19 PROCEDURE — 36415 COLL VENOUS BLD VENIPUNCTURE: CPT | Mod: ORL | Performed by: FAMILY MEDICINE

## 2025-05-08 ENCOUNTER — LAB REQUISITION (OUTPATIENT)
Dept: LAB | Facility: CLINIC | Age: OVER 89
End: 2025-05-08
Payer: COMMERCIAL

## 2025-05-08 DIAGNOSIS — E03.9 HYPOTHYROIDISM, UNSPECIFIED: ICD-10-CM

## 2025-05-08 DIAGNOSIS — I10 ESSENTIAL (PRIMARY) HYPERTENSION: ICD-10-CM

## 2025-05-08 DIAGNOSIS — E87.1 HYPO-OSMOLALITY AND HYPONATREMIA: ICD-10-CM

## 2025-05-08 DIAGNOSIS — G30.1 ALZHEIMER'S DISEASE WITH LATE ONSET (CODE) (H): ICD-10-CM

## 2025-05-15 LAB
ANION GAP SERPL CALCULATED.3IONS-SCNC: 10 MMOL/L (ref 7–15)
BUN SERPL-MCNC: 26.1 MG/DL (ref 8–23)
CALCIUM SERPL-MCNC: 9 MG/DL (ref 8.8–10.4)
CHLORIDE SERPL-SCNC: 97 MMOL/L (ref 98–107)
CREAT SERPL-MCNC: 1.13 MG/DL (ref 0.51–0.95)
EGFRCR SERPLBLD CKD-EPI 2021: 45 ML/MIN/1.73M2
ERYTHROCYTE [DISTWIDTH] IN BLOOD BY AUTOMATED COUNT: 12.8 % (ref 10–15)
GLUCOSE SERPL-MCNC: 126 MG/DL (ref 70–99)
HCO3 SERPL-SCNC: 24 MMOL/L (ref 22–29)
HCT VFR BLD AUTO: 29.9 % (ref 35–47)
HGB BLD-MCNC: 9.9 G/DL (ref 11.7–15.7)
MCH RBC QN AUTO: 31.3 PG (ref 26.5–33)
MCHC RBC AUTO-ENTMCNC: 33.1 G/DL (ref 31.5–36.5)
MCV RBC AUTO: 95 FL (ref 78–100)
PLATELET # BLD AUTO: 122 10E3/UL (ref 150–450)
POTASSIUM SERPL-SCNC: 4.6 MMOL/L (ref 3.4–5.3)
RBC # BLD AUTO: 3.16 10E6/UL (ref 3.8–5.2)
SODIUM SERPL-SCNC: 131 MMOL/L (ref 135–145)
TSH SERPL DL<=0.005 MIU/L-ACNC: 1.31 UIU/ML (ref 0.3–4.2)
WBC # BLD AUTO: 5.5 10E3/UL (ref 4–11)

## 2025-05-15 PROCEDURE — 36415 COLL VENOUS BLD VENIPUNCTURE: CPT | Mod: ORL | Performed by: FAMILY MEDICINE

## 2025-05-15 PROCEDURE — P9604 ONE-WAY ALLOW PRORATED TRIP: HCPCS | Mod: ORL | Performed by: FAMILY MEDICINE

## 2025-05-15 PROCEDURE — 84443 ASSAY THYROID STIM HORMONE: CPT | Mod: ORL | Performed by: FAMILY MEDICINE

## 2025-05-15 PROCEDURE — 80048 BASIC METABOLIC PNL TOTAL CA: CPT | Mod: ORL | Performed by: FAMILY MEDICINE

## 2025-05-15 PROCEDURE — 85027 COMPLETE CBC AUTOMATED: CPT | Mod: ORL | Performed by: FAMILY MEDICINE

## 2025-06-15 ENCOUNTER — LAB REQUISITION (OUTPATIENT)
Dept: LAB | Facility: CLINIC | Age: OVER 89
End: 2025-06-15
Payer: COMMERCIAL

## 2025-06-15 DIAGNOSIS — E03.9 HYPOTHYROIDISM, UNSPECIFIED: ICD-10-CM

## 2025-06-15 DIAGNOSIS — D69.6 THROMBOCYTOPENIA, UNSPECIFIED: ICD-10-CM

## 2025-06-15 DIAGNOSIS — I10 ESSENTIAL (PRIMARY) HYPERTENSION: ICD-10-CM

## 2025-06-15 DIAGNOSIS — G30.1 ALZHEIMER'S DISEASE WITH LATE ONSET (CODE) (H): ICD-10-CM

## 2025-06-15 DIAGNOSIS — N18.32 CHRONIC KIDNEY DISEASE, STAGE 3B (H): ICD-10-CM

## 2025-06-15 DIAGNOSIS — D64.9 ANEMIA, UNSPECIFIED: ICD-10-CM

## 2025-06-26 LAB
ERYTHROCYTE [DISTWIDTH] IN BLOOD BY AUTOMATED COUNT: 13.1 % (ref 10–15)
HCT VFR BLD AUTO: 28.2 % (ref 35–47)
HGB BLD-MCNC: 9.5 G/DL (ref 11.7–15.7)
MCH RBC QN AUTO: 30.6 PG (ref 26.5–33)
MCHC RBC AUTO-ENTMCNC: 33.7 G/DL (ref 31.5–36.5)
MCV RBC AUTO: 91 FL (ref 78–100)
PLATELET # BLD AUTO: 123 10E3/UL (ref 150–450)
RBC # BLD AUTO: 3.1 10E6/UL (ref 3.8–5.2)
WBC # BLD AUTO: 5.6 10E3/UL (ref 4–11)

## 2025-06-26 PROCEDURE — P9603 ONE-WAY ALLOW PRORATED MILES: HCPCS | Mod: ORL | Performed by: PHYSICIAN ASSISTANT

## 2025-06-26 PROCEDURE — 36415 COLL VENOUS BLD VENIPUNCTURE: CPT | Mod: ORL | Performed by: PHYSICIAN ASSISTANT

## 2025-06-26 PROCEDURE — 85027 COMPLETE CBC AUTOMATED: CPT | Mod: ORL | Performed by: PHYSICIAN ASSISTANT
